# Patient Record
Sex: MALE | Race: WHITE | NOT HISPANIC OR LATINO | Employment: OTHER | URBAN - METROPOLITAN AREA
[De-identification: names, ages, dates, MRNs, and addresses within clinical notes are randomized per-mention and may not be internally consistent; named-entity substitution may affect disease eponyms.]

---

## 2019-05-22 ENCOUNTER — THERAPY VISIT (OUTPATIENT)
Dept: PHYSICAL THERAPY | Facility: CLINIC | Age: 79
End: 2019-05-22
Payer: MEDICARE

## 2019-05-22 PROCEDURE — 97110 THERAPEUTIC EXERCISES: CPT | Mod: GP | Performed by: PHYSICAL THERAPIST

## 2019-05-22 PROCEDURE — 97161 PT EVAL LOW COMPLEX 20 MIN: CPT | Mod: GP | Performed by: PHYSICAL THERAPIST

## 2019-05-22 NOTE — LETTER
[facility]  [address]  [place] [address]  [phone]    May 28, 2019    Re: Candido Holliday   :   1940  MRN:  [id number]   REFERRING PHYSICIAN:   [facility]    Date of Initial Evaluation:  19  Visits:  Rxs Used: 1  Reason for Referral:  Acute bilateral low back pain without sciatica    [facility] Initial Evaluation    Subjective:  OSWESTRY 13/100     The history is provided by the patient.  No  was used.   Candido Holliday is a 78 year old male with a lumbar condition.  Condition occurred with:  Insidious onset.  Condition occurred: for unknown reasons.  This is a new condition  2019.    Patient reports pain:  Lower lumbar spine, lumbar spine left and lumbar spine right.     and is intermittent  and reported as 4/10.  Associated symptoms:  Loss of motion. Pain is worse during the day and worse in the A.M..  Symptoms are exacerbated by bending, sitting, lifting and standing  and relieved by activity/movement.  Since onset symptoms are gradually improving.  Special tests:  X-ray.  General health as reported by patient is excellent.  Pertinent medical history includes:  None.  Medical allergies: no.  Other surgeries include:  Orthopedic surgery (Bilateral knee, right hip, right shoulder.).  Current medications:  None as reported by the patient.  Current occupation is Retired.      Barriers include:  None as reported by the patient.    Red flags:  None as reported by the patient.    Oswestry Score: 13.33 %                 Objective:  Standing Alignment:    Shoulder/UE:  Rounded shoulders  Lumbar:  Lordosis decr    Gait:    Gait Type:  Normal     Flexibility/Screens:   Positive screens:  Lumbar  Neurological: He is alert. He has normal strength and normal reflexes. No cranial nerve deficit or sensory deficit.     Lumbar/SI Evaluation  ROM:    AROM Lumbar:   Flexion:          Increased pain,  endrange pain, no worse  Ext:                    Decrease pain limited range of motion   Side Bend:        Left:  No effect    Right:  No effect  Rotation:           Left:     Right:   Side Glide:        Left:     Right:         Lumbar Myotomes:  normal    Lumbar DTR's:  normal    Lumbar Dermtomes:  normal    Neural Tension/Mobility:  Lumbar:  Normal      Lumbar Palpation:  normal    Assessment/Plan:    Patient is a 78 year old male with lumbar complaints.    Patient has the following significant findings with corresponding treatment plan.                Diagnosis 1:  Mechanical low back pain  Pain -  hot/cold therapy, self management, education, directional preference exercise and home program  Decreased ROM/flexibility - manual therapy and therapeutic exercise    Therapy Evaluation Codes:   1) History comprised of:   Personal factors that impact the plan of care:      None.    Comorbidity factors that impact the plan of care are:      None.     Medications impacting care: None.  2) Examination of Body Systems comprised of:   Body structures and functions that impact the plan of care:      Lumbar spine.   Activity limitations that impact the plan of care are:      Bending, Driving, Lifting, Sitting and Standing.  3) Clinical presentation characteristics are:   Stable/Uncomplicated.  4) Decision-Making    Low complexity using standardized patient assessment instrument and/or measureable assessment of functional outcome.    Cumulative Therapy Evaluation is: Low complexity.  Previous and current functional limitations:  (See Goal Flow Sheet for this information)    Short term and Long term goals: (See Goal Flow Sheet for this information)   Communication ability:  Patient appears to be able to clearly communicate and understand verbal and written communication and follow directions correctly.  Treatment Explanation - The following has been discussed with the patient:   RX ordered/plan of care  Anticipated  outcomes  Possible risks and side effects  This patient would benefit from PT intervention to resume normal activities.   Rehab potential is good.    Frequency:  1 X week, once daily  Duration:  for 6 weeks  Discharge Plan:  Achieve all LTG.  Independent in home treatment program.  Reach maximal therapeutic benefit.    Thank you for your referral.    INQUIRIES  Therapist: Victorino Cano, PT, ATC, Cert. [facility]  [address]  [place] [address]  Phone: [phone]  Fax: [phone]

## 2019-05-28 NOTE — PROGRESS NOTES
[facility] Initial Evaluation  Subjective:   OSWESTRY 13/100      The history is provided by the patient.  No  was used.   Candido Holliday is a 78 year old male with a lumbar condition.  Condition occurred with:  Insidious onset.  Condition occurred: for unknown reasons.  This is a new condition  March 2019.    Patient reports pain:  Lower lumbar spine, lumbar spine left and lumbar spine right.     and is intermittent  and reported as 4/10.  Associated symptoms:  Loss of motion. Pain is worse during the day and worse in the A.M..  Symptoms are exacerbated by bending, sitting, lifting and standing  and relieved by activity/movement.  Since onset symptoms are gradually improving.  Special tests:  X-ray.      General health as reported by patient is excellent.  Pertinent medical history includes:  None.  Medical allergies: no.  Other surgeries include:  Orthopedic surgery (Bilateral knee, right hip, right shoulder.).  Current medications:  None as reported by the patient.  Current occupation is Retired.        Barriers include:  None as reported by the patient.    Red flags:  None as reported by the patient.      Oswestry Score: 13.33 %                 Objective:  Standing Alignment:      Shoulder/UE:  Rounded shoulders  Lumbar:  Lordosis decr            Gait:    Gait Type:  Normal          Flexibility/Screens:   Positive screens:  Lumbar          Neurological: He is alert. He has normal strength and normal reflexes. No cranial nerve deficit or sensory deficit.            Lumbar/SI Evaluation  ROM:    AROM Lumbar:   Flexion:          Increased pain, endrange pain, no worse  Ext:                    Decrease pain limited range of motion   Side Bend:        Left:  No effect    Right:  No effect  Rotation:           Left:     Right:   Side Glide:        Left:     Right:           Lumbar Myotomes:  normal            Lumbar DTR's:  normal        Lumbar Dermtomes:   normal                Neural Tension/Mobility:  Lumbar:  Normal        Lumbar Palpation:  normal                                                         General     ROS    Assessment/Plan:    Patient is a 78 year old male with lumbar complaints.    Patient has the following significant findings with corresponding treatment plan.                Diagnosis 1:  Mechanical low back pain  Pain -  hot/cold therapy, self management, education, directional preference exercise and home program  Decreased ROM/flexibility - manual therapy and therapeutic exercise    Therapy Evaluation Codes:   1) History comprised of:   Personal factors that impact the plan of care:      None.    Comorbidity factors that impact the plan of care are:      None.     Medications impacting care: None.  2) Examination of Body Systems comprised of:   Body structures and functions that impact the plan of care:      Lumbar spine.   Activity limitations that impact the plan of care are:      Bending, Driving, Lifting, Sitting and Standing.  3) Clinical presentation characteristics are:   Stable/Uncomplicated.  4) Decision-Making    Low complexity using standardized patient assessment instrument and/or measureable assessment of functional outcome.  Cumulative Therapy Evaluation is: Low complexity.    Previous and current functional limitations:  (See Goal Flow Sheet for this information)    Short term and Long term goals: (See Goal Flow Sheet for this information)     Communication ability:  Patient appears to be able to clearly communicate and understand verbal and written communication and follow directions correctly.  Treatment Explanation - The following has been discussed with the patient:   RX ordered/plan of care  Anticipated outcomes  Possible risks and side effects  This patient would benefit from PT intervention to resume normal activities.   Rehab potential is good.    Frequency:  1 X week, once daily  Duration:  for 6 weeks  Discharge Plan:   Achieve all LTG.  Independent in home treatment program.  Reach maximal therapeutic benefit.    Addendum 7/19/19: patient has been self managing for the past couple months, Plan to discontinue PT for this episode of care.     Please refer to the daily flowsheet for treatment today, total treatment time and time spent performing 1:1 timed codes.

## 2021-01-15 ENCOUNTER — HEALTH MAINTENANCE LETTER (OUTPATIENT)
Age: 81
End: 2021-01-15

## 2021-02-12 ENCOUNTER — IMMUNIZATION (OUTPATIENT)
Dept: PEDIATRICS | Facility: CLINIC | Age: 81
End: 2021-02-12
Payer: MEDICARE

## 2021-02-12 PROCEDURE — 91300 PR COVID VAC PFIZER DIL RECON 30 MCG/0.3 ML IM: CPT

## 2021-02-12 PROCEDURE — 0001A PR COVID VAC PFIZER DIL RECON 30 MCG/0.3 ML IM: CPT

## 2021-03-05 ENCOUNTER — IMMUNIZATION (OUTPATIENT)
Dept: PEDIATRICS | Facility: CLINIC | Age: 81
End: 2021-03-05
Attending: INTERNAL MEDICINE
Payer: MEDICARE

## 2021-03-05 PROCEDURE — 91300 PR COVID VAC PFIZER DIL RECON 30 MCG/0.3 ML IM: CPT

## 2021-03-05 PROCEDURE — 0002A PR COVID VAC PFIZER DIL RECON 30 MCG/0.3 ML IM: CPT

## 2021-09-04 ENCOUNTER — HEALTH MAINTENANCE LETTER (OUTPATIENT)
Age: 81
End: 2021-09-04

## 2022-02-19 ENCOUNTER — HEALTH MAINTENANCE LETTER (OUTPATIENT)
Age: 82
End: 2022-02-19

## 2022-09-30 ENCOUNTER — HOSPITAL ENCOUNTER (INPATIENT)
Facility: CLINIC | Age: 82
LOS: 5 days | Discharge: HOME | DRG: 246 | End: 2022-10-05
Attending: EMERGENCY MEDICINE | Admitting: INTERNAL MEDICINE
Payer: MEDICARE

## 2022-09-30 ENCOUNTER — APPOINTMENT (OUTPATIENT)
Dept: CT IMAGING | Facility: CLINIC | Age: 82
DRG: 246 | End: 2022-09-30
Attending: EMERGENCY MEDICINE
Payer: MEDICARE

## 2022-09-30 DIAGNOSIS — I25.110 CORONARY ARTERY DISEASE INVOLVING NATIVE CORONARY ARTERY OF NATIVE HEART WITH UNSTABLE ANGINA PECTORIS (H): ICD-10-CM

## 2022-09-30 DIAGNOSIS — I21.4 NSTEMI (NON-ST ELEVATED MYOCARDIAL INFARCTION) (H): Primary | ICD-10-CM

## 2022-09-30 PROBLEM — R53.1 WEAKNESS: Status: ACTIVE | Noted: 2022-09-30

## 2022-09-30 PROBLEM — R55 SYNCOPE: Status: ACTIVE | Noted: 2022-09-30

## 2022-09-30 PROBLEM — F41.9 ANXIETY: Status: ACTIVE | Noted: 2022-09-30

## 2022-09-30 PROBLEM — R06.02 SHORTNESS OF BREATH: Status: ACTIVE | Noted: 2022-09-30

## 2022-09-30 PROBLEM — F32.A DEPRESSION: Status: ACTIVE | Noted: 2022-09-30

## 2022-09-30 LAB
ALBUMIN SERPL-MCNC: 3 G/DL (ref 3.4–5)
ALP SERPL-CCNC: 53 U/L (ref 40–150)
ALT SERPL W P-5'-P-CCNC: 79 U/L (ref 0–70)
ANION GAP SERPL CALCULATED.3IONS-SCNC: 5 MMOL/L (ref 3–14)
AST SERPL W P-5'-P-CCNC: 67 U/L (ref 0–45)
ATRIAL RATE - MUSE: 64 BPM
BASOPHILS # BLD AUTO: 0 10E3/UL (ref 0–0.2)
BASOPHILS NFR BLD AUTO: 1 %
BILIRUB SERPL-MCNC: 0.9 MG/DL (ref 0.2–1.3)
BUN SERPL-MCNC: 19 MG/DL (ref 7–30)
CALCIUM SERPL-MCNC: 8.5 MG/DL (ref 8.5–10.1)
CHLORIDE BLD-SCNC: 102 MMOL/L (ref 94–109)
CO2 SERPL-SCNC: 29 MMOL/L (ref 20–32)
CREAT SERPL-MCNC: 1.07 MG/DL (ref 0.66–1.25)
D DIMER PPP FEU-MCNC: 1.33 UG/ML FEU (ref 0–0.5)
DIASTOLIC BLOOD PRESSURE - MUSE: NORMAL MMHG
EOSINOPHIL # BLD AUTO: 0 10E3/UL (ref 0–0.7)
EOSINOPHIL NFR BLD AUTO: 1 %
ERYTHROCYTE [DISTWIDTH] IN BLOOD BY AUTOMATED COUNT: 13.1 % (ref 10–15)
ERYTHROCYTE [DISTWIDTH] IN BLOOD BY AUTOMATED COUNT: 13.2 % (ref 10–15)
GFR SERPL CREATININE-BSD FRML MDRD: 69 ML/MIN/1.73M2
GLUCOSE BLD-MCNC: 97 MG/DL (ref 70–99)
HCT VFR BLD AUTO: 42.4 % (ref 40–53)
HCT VFR BLD AUTO: 45.1 % (ref 40–53)
HGB BLD-MCNC: 14.3 G/DL (ref 13.3–17.7)
HGB BLD-MCNC: 14.9 G/DL (ref 13.3–17.7)
HOLD SPECIMEN: NORMAL
IMM GRANULOCYTES # BLD: 0 10E3/UL
IMM GRANULOCYTES NFR BLD: 0 %
INR PPP: 1.01 (ref 0.85–1.15)
INTERPRETATION ECG - MUSE: NORMAL
LIPASE SERPL-CCNC: 133 U/L (ref 73–393)
LYMPHOCYTES # BLD AUTO: 1.3 10E3/UL (ref 0.8–5.3)
LYMPHOCYTES NFR BLD AUTO: 32 %
MCH RBC QN AUTO: 30.6 PG (ref 26.5–33)
MCH RBC QN AUTO: 31 PG (ref 26.5–33)
MCHC RBC AUTO-ENTMCNC: 33 G/DL (ref 31.5–36.5)
MCHC RBC AUTO-ENTMCNC: 33.7 G/DL (ref 31.5–36.5)
MCV RBC AUTO: 91 FL (ref 78–100)
MCV RBC AUTO: 94 FL (ref 78–100)
MONOCYTES # BLD AUTO: 0.6 10E3/UL (ref 0–1.3)
MONOCYTES NFR BLD AUTO: 14 %
NEUTROPHILS # BLD AUTO: 2.2 10E3/UL (ref 1.6–8.3)
NEUTROPHILS NFR BLD AUTO: 52 %
NRBC # BLD AUTO: 0 10E3/UL
NRBC BLD AUTO-RTO: 0 /100
NT-PROBNP SERPL-MCNC: 1028 PG/ML (ref 0–1800)
P AXIS - MUSE: 2 DEGREES
PLATELET # BLD AUTO: 144 10E3/UL (ref 150–450)
PLATELET # BLD AUTO: 145 10E3/UL (ref 150–450)
POTASSIUM BLD-SCNC: 4.3 MMOL/L (ref 3.4–5.3)
PR INTERVAL - MUSE: 326 MS
PROT SERPL-MCNC: 7.1 G/DL (ref 6.8–8.8)
QRS DURATION - MUSE: 132 MS
QT - MUSE: 410 MS
QTC - MUSE: 422 MS
R AXIS - MUSE: -8 DEGREES
RBC # BLD AUTO: 4.67 10E6/UL (ref 4.4–5.9)
RBC # BLD AUTO: 4.81 10E6/UL (ref 4.4–5.9)
SARS-COV-2 RNA RESP QL NAA+PROBE: POSITIVE
SODIUM SERPL-SCNC: 136 MMOL/L (ref 133–144)
SYSTOLIC BLOOD PRESSURE - MUSE: NORMAL MMHG
T AXIS - MUSE: 58 DEGREES
TROPONIN I SERPL HS-MCNC: 4627 NG/L
TROPONIN I SERPL HS-MCNC: 5430 NG/L
VENTRICULAR RATE- MUSE: 64 BPM
WBC # BLD AUTO: 4.2 10E3/UL (ref 4–11)
WBC # BLD AUTO: 5.1 10E3/UL (ref 4–11)

## 2022-09-30 PROCEDURE — 96376 TX/PRO/DX INJ SAME DRUG ADON: CPT

## 2022-09-30 PROCEDURE — 83690 ASSAY OF LIPASE: CPT | Performed by: EMERGENCY MEDICINE

## 2022-09-30 PROCEDURE — 250N000013 HC RX MED GY IP 250 OP 250 PS 637: Performed by: EMERGENCY MEDICINE

## 2022-09-30 PROCEDURE — 99285 EMERGENCY DEPT VISIT HI MDM: CPT | Mod: 25

## 2022-09-30 PROCEDURE — U0003 INFECTIOUS AGENT DETECTION BY NUCLEIC ACID (DNA OR RNA); SEVERE ACUTE RESPIRATORY SYNDROME CORONAVIRUS 2 (SARS-COV-2) (CORONAVIRUS DISEASE [COVID-19]), AMPLIFIED PROBE TECHNIQUE, MAKING USE OF HIGH THROUGHPUT TECHNOLOGIES AS DESCRIBED BY CMS-2020-01-R: HCPCS | Performed by: EMERGENCY MEDICINE

## 2022-09-30 PROCEDURE — 85004 AUTOMATED DIFF WBC COUNT: CPT | Performed by: EMERGENCY MEDICINE

## 2022-09-30 PROCEDURE — 210N000002 HC R&B HEART CARE

## 2022-09-30 PROCEDURE — 84484 ASSAY OF TROPONIN QUANT: CPT | Performed by: NURSE PRACTITIONER

## 2022-09-30 PROCEDURE — 85025 COMPLETE CBC W/AUTO DIFF WBC: CPT | Performed by: EMERGENCY MEDICINE

## 2022-09-30 PROCEDURE — 85610 PROTHROMBIN TIME: CPT | Performed by: EMERGENCY MEDICINE

## 2022-09-30 PROCEDURE — 36415 COLL VENOUS BLD VENIPUNCTURE: CPT | Performed by: NURSE PRACTITIONER

## 2022-09-30 PROCEDURE — 84484 ASSAY OF TROPONIN QUANT: CPT | Performed by: EMERGENCY MEDICINE

## 2022-09-30 PROCEDURE — 85379 FIBRIN DEGRADATION QUANT: CPT | Performed by: EMERGENCY MEDICINE

## 2022-09-30 PROCEDURE — 250N000009 HC RX 250: Performed by: EMERGENCY MEDICINE

## 2022-09-30 PROCEDURE — 71275 CT ANGIOGRAPHY CHEST: CPT | Mod: MA

## 2022-09-30 PROCEDURE — C9803 HOPD COVID-19 SPEC COLLECT: HCPCS

## 2022-09-30 PROCEDURE — 250N000011 HC RX IP 250 OP 636: Performed by: EMERGENCY MEDICINE

## 2022-09-30 PROCEDURE — 99222 1ST HOSP IP/OBS MODERATE 55: CPT | Mod: AI | Performed by: INTERNAL MEDICINE

## 2022-09-30 PROCEDURE — 99207 PR APP CREDIT; MD BILLING SHARED VISIT: CPT | Performed by: NURSE PRACTITIONER

## 2022-09-30 PROCEDURE — 83880 ASSAY OF NATRIURETIC PEPTIDE: CPT | Performed by: NURSE PRACTITIONER

## 2022-09-30 PROCEDURE — 36415 COLL VENOUS BLD VENIPUNCTURE: CPT | Performed by: EMERGENCY MEDICINE

## 2022-09-30 PROCEDURE — 93005 ELECTROCARDIOGRAM TRACING: CPT

## 2022-09-30 PROCEDURE — 80053 COMPREHEN METABOLIC PANEL: CPT | Performed by: EMERGENCY MEDICINE

## 2022-09-30 PROCEDURE — 250N000013 HC RX MED GY IP 250 OP 250 PS 637: Performed by: NURSE PRACTITIONER

## 2022-09-30 PROCEDURE — 85027 COMPLETE CBC AUTOMATED: CPT | Performed by: EMERGENCY MEDICINE

## 2022-09-30 PROCEDURE — 96365 THER/PROPH/DIAG IV INF INIT: CPT | Mod: 59

## 2022-09-30 PROCEDURE — 96366 THER/PROPH/DIAG IV INF ADDON: CPT

## 2022-09-30 RX ORDER — MELOXICAM 15 MG/1
15 TABLET ORAL DAILY
COMMUNITY
End: 2023-03-30

## 2022-09-30 RX ORDER — HEPARIN SODIUM 10000 [USP'U]/100ML
0-5000 INJECTION, SOLUTION INTRAVENOUS CONTINUOUS
Status: DISCONTINUED | OUTPATIENT
Start: 2022-09-30 | End: 2022-10-01

## 2022-09-30 RX ORDER — IOPAMIDOL 755 MG/ML
75 INJECTION, SOLUTION INTRAVASCULAR ONCE
Status: COMPLETED | OUTPATIENT
Start: 2022-09-30 | End: 2022-09-30

## 2022-09-30 RX ORDER — VENLAFAXINE HYDROCHLORIDE 150 MG/1
150 CAPSULE, EXTENDED RELEASE ORAL DAILY
COMMUNITY

## 2022-09-30 RX ORDER — VENLAFAXINE HYDROCHLORIDE 150 MG/1
150 CAPSULE, EXTENDED RELEASE ORAL DAILY
Status: DISCONTINUED | OUTPATIENT
Start: 2022-10-01 | End: 2022-10-05 | Stop reason: HOSPADM

## 2022-09-30 RX ORDER — OMEPRAZOLE 40 MG/1
40 CAPSULE, DELAYED RELEASE ORAL DAILY
COMMUNITY

## 2022-09-30 RX ORDER — ASPIRIN 81 MG/1
162 TABLET, CHEWABLE ORAL ONCE
Status: COMPLETED | OUTPATIENT
Start: 2022-09-30 | End: 2022-09-30

## 2022-09-30 RX ORDER — PANTOPRAZOLE SODIUM 40 MG/1
40 TABLET, DELAYED RELEASE ORAL
Status: DISCONTINUED | OUTPATIENT
Start: 2022-10-01 | End: 2022-10-05 | Stop reason: HOSPADM

## 2022-09-30 RX ORDER — ALPRAZOLAM 0.25 MG
0.25 TABLET ORAL 2 TIMES DAILY PRN
Status: DISCONTINUED | OUTPATIENT
Start: 2022-09-30 | End: 2022-10-05 | Stop reason: HOSPADM

## 2022-09-30 RX ORDER — ATENOLOL 25 MG/1
25 TABLET ORAL DAILY
Status: ON HOLD | COMMUNITY
End: 2022-10-04

## 2022-09-30 RX ORDER — TAMSULOSIN HYDROCHLORIDE 0.4 MG/1
0.4 CAPSULE ORAL DAILY
COMMUNITY

## 2022-09-30 RX ADMIN — METOPROLOL TARTRATE 12.5 MG: 25 TABLET, FILM COATED ORAL at 21:25

## 2022-09-30 RX ADMIN — HEPARIN SODIUM 1150 UNITS/HR: 10000 INJECTION, SOLUTION INTRAVENOUS at 18:02

## 2022-09-30 RX ADMIN — SODIUM CHLORIDE 98 ML: 9 INJECTION, SOLUTION INTRAVENOUS at 16:54

## 2022-09-30 RX ADMIN — ASPIRIN 81 MG CHEWABLE TABLET 162 MG: 81 TABLET CHEWABLE at 18:04

## 2022-09-30 RX ADMIN — IOPAMIDOL 75 ML: 755 INJECTION, SOLUTION INTRAVENOUS at 16:54

## 2022-09-30 RX ADMIN — ALUMINUM HYDROXIDE, MAGNESIUM HYDROXIDE, AND DIMETHICONE 30 ML: 200; 20; 200 SUSPENSION ORAL at 13:40

## 2022-09-30 ASSESSMENT — ENCOUNTER SYMPTOMS
BLOOD IN STOOL: 0
HEMATURIA: 0
CONSTIPATION: 0
DIFFICULTY URINATING: 0
SHORTNESS OF BREATH: 1
DYSURIA: 0
DIARRHEA: 0

## 2022-09-30 ASSESSMENT — ACTIVITIES OF DAILY LIVING (ADL)
ADLS_ACUITY_SCORE: 35

## 2022-09-30 NOTE — Clinical Note
The first balloon was inserted into the circumflex.Max pressure = 15 dannie. Total duration = 30 seconds.

## 2022-09-30 NOTE — ED TRIAGE NOTES
Chest pressure last 2 days on and off. Shortness of breath for 2 weeks. No appetite.     Triage Assessment     Row Name 09/30/22 1370       Triage Assessment (Adult)    Airway WDL WDL       Respiratory WDL    Respiratory WDL WDL       Skin Circulation/Temperature WDL    Skin Circulation/Temperature WDL WDL       Cardiac WDL    Cardiac WDL X  mid chest pain       Peripheral/Neurovascular WDL    Peripheral Neurovascular WDL WDL       Cognitive/Neuro/Behavioral WDL    Cognitive/Neuro/Behavioral WDL WDL

## 2022-09-30 NOTE — Clinical Note
The first balloon was inserted into the circumflex.Max pressure = 14 dannie. Total duration = 10 seconds.     Max pressure = 20 dannie. Total duration = 14 seconds.    Balloon reinflated a second time: Max pressure = 20 dannie. Total duration = 14 seconds.

## 2022-09-30 NOTE — Clinical Note
The first balloon was inserted into the circumflex.Max pressure = 8 dannie. Total duration = 9 seconds.     Max pressure = 8 dannie. Total duration = 8 seconds.    Balloon reinflated a second time: Max pressure = 8 dannie. Total duration = 8 seconds.  Balloon reinflated a third time: Max pressure = 8 dannie. Total duration = 11 seconds.  Balloon reinflated a fourth time: Max pressure = 10 dannie. Total duration = 9 seconds.

## 2022-09-30 NOTE — ED PROVIDER NOTES
And  History   Chief Complaint:  Chest Pain     The history is provided by the patient and the spouse.      Candido Holliday is a 81 year old male with history of sleep apnea who presents with chest pain. Patient reports that for the past few weeks he wakes up with mid sternal/lower chest pain and shortness of breath that gradually improves throughout the day. He states that each episode lasts for a few minutes and resolves on its own. Nothing notably exacerbates his pain and shortness of breath. He notes that he easily acquires pneumonia. No blood thinners, leg swelling, recent long travel or recent surgeries. He adds that he gardens often, which will occasionally help his symptoms. No history of abdominal surgeries. No change in bladder or bowel movements. No black/bloody stools or difficulty urinating.    Review of Systems   Respiratory: Positive for shortness of breath.    Cardiovascular: Positive for chest pain. Negative for leg swelling.   Gastrointestinal: Negative for blood in stool, constipation and diarrhea.   Genitourinary: Negative for difficulty urinating, dysuria and hematuria.   All other systems reviewed and are negative.    Allergies:  Legumes  Peas  No Known Drug Allergies     Medications:  Aspirin 81 mg   Alprazolam  Tamsulosin  Meloxicam  Atenolol  Venlafaxine  Omeprazole    Past Medical History:     Depression  Strep throat  Influenza  Left shoulder osteoarthritis  Cervical dystonia  Rotator cuff tear  Traumatic rupture of proximal biceps tendon, right  Impingement syndrome of right shoulder  Right shoulder subscapularis tear  Sleep apnea  Epididymitis  Reflux    Past Surgical History:    Bilateral knee replacement  Right hip replacement  Right shoulder arthroscopic-assisted RCR, SAD, AC joint resection, coracoid decompression, and open subpectoralis biceps resection/transplantation  Appendectomy    Family History:    Father - heart murmur, pneumonia  Mother - stroke    Social History:  Presents  "with his wife    Presents via private vehicle  PCP: Enrique Truong     Physical Exam     Patient Vitals for the past 24 hrs:   BP Temp Temp src Pulse Resp SpO2 Height Weight   09/30/22 1329 129/70 97.3  F (36.3  C) Temporal 66 20 97 % 1.905 m (6' 3\") 95.3 kg (210 lb)       Physical Exam  Vitals reviewed.   HENT:      Head: Normocephalic.   Cardiovascular:      Rate and Rhythm: Normal rate and regular rhythm.      Heart sounds: Normal heart sounds.   Pulmonary:      Effort: Pulmonary effort is normal.      Breath sounds: Normal breath sounds.   Musculoskeletal:         General: Normal range of motion.   Skin:     General: Skin is warm.      Capillary Refill: Capillary refill takes less than 2 seconds.   Neurological:      General: No focal deficit present.      Mental Status: He is alert and oriented to person, place, and time.   Psychiatric:         Mood and Affect: Mood is anxious.           Emergency Department Course   ECG  ECG results from 09/30/22 @ 1325   EKG 12 lead     Value    Systolic Blood Pressure     Diastolic Blood Pressure     Ventricular Rate 64    Atrial Rate 64    AZ Interval 326    QRS Duration 132        QTc 422    P Axis 2    R AXIS -8    T Axis 58    Interpretation ECG      Sinus rhythm with 1st degree A-V block  Right bundle branch block  When compared with ECG of 17-NOV-2014 09:16,  No change was found.         Imaging:  CT Chest Pulmonary Embolism w Contrast   Final Result   IMPRESSION:   1.  No pulmonary embolism demonstrated.   2.  Trace peripheral probable atelectasis versus less likely infiltrate.   3.  Elevated right hemidiaphragm.           Report per radiology    Laboratory:  Labs Ordered and Resulted from Time of ED Arrival to Time of ED Departure   COMPREHENSIVE METABOLIC PANEL - Abnormal       Result Value    Sodium 136      Potassium 4.3      Chloride 102      Carbon Dioxide (CO2) 29      Anion Gap 5      Urea Nitrogen 19      Creatinine 1.07      Calcium 8.5      " Glucose 97      Alkaline Phosphatase 53      AST 67 (*)     ALT 79 (*)     Protein Total 7.1      Albumin 3.0 (*)     Bilirubin Total 0.9      GFR Estimate 69     D DIMER QUANTITATIVE - Abnormal    D-Dimer Quantitative 1.33 (*)    CBC WITH PLATELETS AND DIFFERENTIAL - Abnormal    WBC Count 4.2      RBC Count 4.81      Hemoglobin 14.9      Hematocrit 45.1      MCV 94      MCH 31.0      MCHC 33.0      RDW 13.2      Platelet Count 144 (*)     % Neutrophils 52      % Lymphocytes 32      % Monocytes 14      % Eosinophils 1      % Basophils 1      % Immature Granulocytes 0      NRBCs per 100 WBC 0      Absolute Neutrophils 2.2      Absolute Lymphocytes 1.3      Absolute Monocytes 0.6      Absolute Eosinophils 0.0      Absolute Basophils 0.0      Absolute Immature Granulocytes 0.0      Absolute NRBCs 0.0     TROPONIN I - Abnormal    Troponin I High Sensitivity 4,627 (*)    INR - Normal    INR 1.01     LIPASE - Normal    Lipase 133     CBC WITH PLATELETS   COVID-19 VIRUS (CORONAVIRUS) BY PCR      Emergency Department Course:     Reviewed:  I reviewed nursing notes, vitals, past medical history and Care Everywhere    Assessments:  1703 I obtained history and examined the patient as noted above.   1737 I rechecked the patient and explained findings.     Consults:  1754 I spoke with Dr. Valdes, hospitalist, who accepts admission.    Interventions:  1340 GI cocktail, 30 mL, PO  1801 Heparin loading dose, 5,700 unit(s), IV  1802 Heparin infusion, 1,150 unit(s)/hr, IV  1804 Aspirin, 162 mg, PO    Disposition:  The patient was admitted to the hospital under the care of Dr. Valdes.     Impression & Plan   Medical Decision Making:  Patient is a 81-year-old male who presents with intermittent chest pain for the last few weeks and episode of near syncope a week ago.  Patient arrival is chest pain-free.  EKG is normal however troponin came back quite elevated over 4000.  This was delayed due to lab error on missed measurement of the  lab test.  Ultimately care was discussed with the patient cannot rule out non-ST segment ovation MI recommend admission for serial troponins and further work-up as indicated.  Patient's history is somewhat atypical due to exertional tolerance able to tolerate gardening and activities at home without difficulty and no chest pain or shortness of breath with exertion.  However due to the positive troponin patient is at risk for 30-day mortality recommend admission for further work-up.  Care was discussed with the hospitalist and was admitted as an inpatient.    Diagnosis:    ICD-10-CM    1. NSTEMI (non-ST elevated myocardial infarction) (H)  I21.4        Scribe Disclosure:  Tray PATEL, am serving as a scribe at 5:01 PM on 9/30/2022 to document services personally performed by Adonis Lemus MD based on my observations and the provider's statements to me.          Adonis Lemus MD  10/01/22 6113

## 2022-09-30 NOTE — Clinical Note
Pt states it just want to go home.       Deb Young RN  12/02/20 0762 R-Band utilized for closure of site.  Mechanical pressure applied applied by scrub person; hemostasis achieved.

## 2022-09-30 NOTE — ED NOTES
Rapid Assessment Note    History:   Candido Holliday is a 81 year old male who presents with his wife with intermittent, nonradiating localized central lower chest/upper abdominal pain with associated shortness of breath and decreased appetite over the last few days. He states the pressure is most apparent in the morning when he wakes up and it takes him longer to adjust in the morning. The pressure is relieved during the day, however, his other symptoms persist. He denies history of heart problems. He denies emesis.    Exam:   General:  Alert, interactive  Cardiovascular:  Well perfused, 2+ radial pulses bilat, RRR  Pulm/chest:  No respiratory distress, no accessory muscle use, chest NT, clear and equal BS bilat  Abd: ND, soft, NT  Neuro:  Moving all 4 extremities  Skin:  Warm, dry  Psych: anxious    Plan of Care:   I evaluated the patient and developed an initial plan of care. I discussed this plan and explained that I, or one of my partners, would be returning to complete the evaluation.     I, Molly Kirkland, am serving as a scribe to document services personally performed by Erik Cleary MD, based on my observations and the provider's statements to me.    9/30/2022  [employer]    Portions of this medical record were completed by a scribe. UPON MY REVIEW AND AUTHENTICATION BY ELECTRONIC SIGNATURE, this confirms (a) I performed the applicable clinical services, and (b) the record is accurate.        Erik Cleary MD  09/30/22 3425

## 2022-09-30 NOTE — Clinical Note
Max pressure = 12 dannie. Total duration = 15 seconds.     Max pressure = 12 dannie. Total duration = 12 seconds.    Balloon reinflated a second time: Max pressure = 12 dannie. Total duration = 12 seconds.  Balloon reinflated a third time: Max pressure = 16 dannie. Total duration = 10 seconds.  Balloon reinflated a fourth time: Max pressure = 18 dannie. Total duration = 10 seconds.

## 2022-09-30 NOTE — Clinical Note
Max pressure = 10 dannie. Total duration = 10 seconds.     Max pressure = 12 dannie. Total duration = 9 seconds.    Balloon reinflated a second time: Max pressure = 12 dannie. Total duration = 9 seconds.

## 2022-09-30 NOTE — H&P
"[facility]    History and Physical  Hospitalist       Date of Admission:  9/30/2022    Assessment & Plan   Candido Holliday is a 81 year old male with PMH significant for GERD, depression, anxiety and SARITA who presented to the ED with complaints of epigastric, lower sternal chest pain, weakness and dizziness that has progressed over the past 4-5 days with recent syncopal event at that time as well. No personal or past family history of HTN, HLD or heart disease. Never a smoker and rare alcohol use. He endorses having a history of \"fluttering\" or \"skipped beats\" and states that he had this in his 20's but denies medication currently or recurrence of this. Work-up in the ED revealed unremarkable BMP and CBC. Mild elevation of ALT/AST without abdominal complaints. D-dimer elevated at 1.33 with CT Chest negative for PE and also revealed elevated right hemidiaphragm and trace peripheral atelectasis. ECG with SR 1AVB and RBBB without obvious signs of ischemia.Troponin elevated at 4627. He was initiated on ASA and heparin infusion in the ED. He was admitted for Cardiology consult and further work-up.     NSTEMI  Chest Pain  Weakness  Shortness of breath  Syncope  Admit with vague, but progressive symptoms that have been ongoing for the past several weeks and have worsened over the last 4-5 days, including SOB, dizziness, weakness, loss of appetite, and patient reported syncopal episode 4 days ago accompanied by dizziness and diaphoresis. Reports that he has a history of chest \"fluttering\" that occurred in his 20's; has not had recurrence and states that he is not on PTA meds for, despite past medication records showing prior use of atenolol.   -Admit to [facility] for NSTEMI work-up with troponin elevation of 4627  -Cardiology consult for likely angiogram; appreciate recommendations  -ASA  -Mild thrombocytopenia at 144 without signs of bleeding and on ASA regimen PTA   -Monitor counts  -Resume heparin " "infusion per protocol  -Lipid panel and Hgb A1c pending  -Start Metoprolol with hold parameters  -Electrolyte protocol  -Trend troponin  -Cardiac diet and NPO at midnight in the event of angiogram in AM  -Bedrest with bathroom privileges       Elevated LFT's  -Unclear etiology with no complaints of abdominal lupe  -Slight elevation of AST/ALT  -Consider US  -Repeat LFT's in AM      Depression  Anxiety  During exam, generalized shaking and endorsing significant anxiety  -PTA regimen: Effexor 150 mg daily  -Alprazolam: takes alprazolam that he states that he has had for the past 15 + years and rarely uses; has taken one tablet daily for the past 4 days due to symptoms noted above that he believed were related to anxiety  -Resume Effexor  -PRN alprazolam while IP  -Has follow-up with PCP for his depression and adjustment of medications on October 5th      SARITA  -CPAP at night    Clinically Significant Risk Factors Present on Admission             # Hypoalbuminemia: Albumin = 3.0 g/dL (Ref range: 3.4 - 5.0 g/dL) on admission, will monitor as appropriate        # Overweight: Estimated body mass index is 26.25 kg/m  as calculated from the following:    Height as of this encounter: 1.905 m (6' 3\").    Weight as of this encounter: 95.3 kg (210 lb).          DVT Prophylaxis: Heparin  Code Status: Full Code      Disposition: Anticipate at least 2 midnight stay for likely angiogram and further work-up of chest pain.     Alyssa Cheng NP  [facility]  Securely message with the Vocera Web Console (learn more here)  Text page via [facility] Paging/Directory       Primary Care Physician   Enrique Truong    Chief Complaint   Chest Pain    History is obtained from the patient and review of the EMR    History of Present Illness   Candido Holliday is a 81 year old male with PMH significant for GERD, depression, anxiety and SARITA who presented to the ED with complaints of epigastric, lower sternal chest pain, weakness " "and dizziness that has progressed over the past 4-5 days. Reports that he has had significant stress over the past few days and has had a prescription of alprazolam for the past 15 plus years. He took one tablet, daily in the past 3-4 days. States that he went into the bathroom when he had a syncopal episode. Reports that his son is in healthcare and had looked up the side effects of alprazolam on [facility]'s website and thought that his current use of this medication had contributed to his overall symptoms. He endorses loss of appetite. He was diaphoretic and dizzy prior to his syncopal episode and has not had a recurrence. Reports that he has had progressive shortness of breath in the past 4-5 days as well. No history of HTN, HLD or heart disease and no known family history. He takes ASA daily. Endorses that he has severe depression and has been following with his PCP closely on this. He is on Effexor and has a follow-up appointment with his PCP for dose adjustment on Wednesday.    Denies current chest pain/pressure. Has some shortness of breath. Denies dizziness and lightheadedness. Denies n/v/d. Reports some \"epigastic pain\" not relieved with GI cocktail. Work-up in the ED revealed unremarkable BMP and CBC. Mild elevation of ALT/AST without abdominal complaints. D-dimer elevated at 1.33 with CT Chest negative for PE. Troponin elevated at 4627. He was initiated on ASA and heparin infusion in the ED. He was admitted for Cardiology consult and further work-up.     PAST MEDICAL HISTORY  Past Medical History:   Diagnosis Date     Depressive disorder      Gastro-oesophageal reflux disease        PAST SURGICAL HISTORY  Past Surgical History:   Procedure Laterality Date     ORTHOPEDIC SURGERY         HOME MEDICATIONS  Prior to Admission medications    Medication Sig Last Dose Taking? Auth Provider Long Term End Date   ASPIRIN 81 MG OR TABS 1 TABLET DAILY   Reported, Patient     EFFEXOR 75 MG OR TABS 1 TABLET TWICE " "DAILY WITH FOOD   Reported, Patient     PREVACID 30 MG OR CPDR 1 CAPSULE DAILY BEFORE EATING   Reported, Patient     XANAX OR None Entered   Reported, Patient         ALLERGIES  No Known Allergies    SOCIAL HISTORY   reports that he has never smoked. He does not have any smokeless tobacco history on file. He reports current alcohol use. He reports that he does not use drugs.    FAMILY HISTORY  family history is not on file.    REVIEW OF SYSTEMS  A 10 point ROS was negative other than the symptoms noted above in the HPI.    Physical Exam   Nursing Notes Reviewed.  /70   Pulse 66   Temp 97.3  F (36.3  C) (Temporal)   Resp 20   Ht 1.905 m (6' 3\")   Wt 95.3 kg (210 lb)   SpO2 97%   BMI 26.25 kg/m     General:  Appears stated age in no acute distress. Anxious  Skin:  Warm, dry. No rashes or lesions on exposed skin.  HEENT:  Normocephalic, atraumatic; EOMs grossly intact.  Neck:  Supple.  Chest:  Breath sounds CTA and no increased work of breathing.  Cardiovascular:  RRR, no rub or murmur. No peripheral edema.  Abdomen:  Soft, non-tender, non-distended.  Musculoskeletal:  Moves all four extremities.  Neurological:  CN 2-12 grossly intact. Notably anxious and generalized shaking    Data   Data reviewed today:  I personally reviewed   Recent Labs   Lab 09/30/22  1335   WBC 4.2   HGB 14.9   MCV 94   *   INR 1.01      POTASSIUM 4.3   CHLORIDE 102   CO2 29   BUN 19   CR 1.07   ANIONGAP 5   BHARTI 8.5   GLC 97   ALBUMIN 3.0*   PROTTOTAL 7.1   BILITOTAL 0.9   ALKPHOS 53   ALT 79*   AST 67*   LIPASE 133       Imaging:  Recent Results (from the past 24 hour(s))   CT Chest Pulmonary Embolism w Contrast    Narrative    EXAM: CT CHEST PULMONARY EMBOLISM W CONTRAST  LOCATION: [facility]  DATE/TIME: 9/30/2022 5:12 PM    INDICATION: Chest pain, elevated D-dimer.    COMPARISON: None.    TECHNIQUE: CT chest pulmonary angiogram during arterial phase injection of IV contrast. Multiplanar " reformats and MIP reconstructions were performed. Dose reduction techniques were used.     CONTRAST: 75 mL Isovue 370.    FINDINGS:  ANGIOGRAM CHEST: Pulmonary arteries are normal caliber and negative for pulmonary emboli. Thoracic aorta is negative for dissection. No CT evidence of right heart strain.    LUNGS AND PLEURA: Elevated right hemidiaphragm. Trace dependent probable atelectasis versus less likely infiltrate. No effusions.    MEDIASTINUM/AXILLAE: No adenopathy or aneurysm.    CORONARY ARTERY CALCIFICATION: Moderate.    UPPER ABDOMEN: No acute findings.    MUSCULOSKELETAL: No frankly destructive bony lesions.      Impression    IMPRESSION:  1.  No pulmonary embolism demonstrated.  2.  Trace peripheral probable atelectasis versus less likely infiltrate.  3.  Elevated right hemidiaphragm.

## 2022-09-30 NOTE — ED NOTES
"[facility]  ED Nurse Handoff Report    ED Chief complaint: Chest Pain      ED Diagnosis:   Final diagnoses:   None       Code Status: not addressed at this time    Allergies: No Known Allergies    Patient Story: Candido Holliday is a 81 year old male who presents with his wife with intermittent, nonradiating localized central lower chest/upper abdominal pain with associated shortness of breath and decreased appetite over the last few days. He states the pressure is most apparent in the morning when he wakes up and it takes him longer to adjust in the morning. The pressure is relieved during the day, however, his other symptoms persist. He denies history of heart problems. He denies emesis.    Focused Assessment:  Patient denies pain. States the pain is worse in the morning and states he was nausea this morning. States this has been going on for a few days.     Treatments and/or interventions provided: labs, IV, heparin, EKG, CT PE scan, ASA  Patient's response to treatments and/or interventions: No change    To be done/followed up on inpatient unit:  [facility]    Does this patient have any cognitive concerns?: A/Ox4    Activity level - Baseline/Home:  Independent  Activity Level - Current:   Stand with Assist    Patient's Preferred language: English   Needed?: No    Isolation: None  Infection: Not Applicable  Patient tested for COVID 19 prior to admission: NO  Bariatric?: No    Vital Signs:   Vitals:    09/30/22 1329   BP: 129/70   Pulse: 66   Resp: 20   Temp: 97.3  F (36.3  C)   TempSrc: Temporal   SpO2: 97%   Weight: 95.3 kg (210 lb)   Height: 1.905 m (6' 3\")       Cardiac Rhythm:     Was the PSS-3 completed:   Yes  What interventions are required if any?               Family Comments: Wife at bedside   OBS brochure/video discussed/provided to patient/family: N/A              Name of person given brochure if not patient: NA              Relationship to patient: NA    For the majority of the " shift this patient's behavior was Green.   Behavioral interventions performed were None.    ED NURSE PHONE NUMBER: [phone]

## 2022-10-01 ENCOUNTER — APPOINTMENT (OUTPATIENT)
Dept: CARDIOLOGY | Facility: CLINIC | Age: 82
DRG: 246 | End: 2022-10-01
Attending: NURSE PRACTITIONER
Payer: MEDICARE

## 2022-10-01 LAB
ALBUMIN SERPL-MCNC: 3.1 G/DL (ref 3.4–5)
ALP SERPL-CCNC: 46 U/L (ref 40–150)
ALT SERPL W P-5'-P-CCNC: 69 U/L (ref 0–70)
ANION GAP SERPL CALCULATED.3IONS-SCNC: 5 MMOL/L (ref 3–14)
AST SERPL W P-5'-P-CCNC: 51 U/L (ref 0–45)
BASOPHILS # BLD AUTO: 0 10E3/UL (ref 0–0.2)
BASOPHILS NFR BLD AUTO: 0 %
BILIRUB DIRECT SERPL-MCNC: 0.3 MG/DL (ref 0–0.2)
BILIRUB SERPL-MCNC: 1.3 MG/DL (ref 0.2–1.3)
BUN SERPL-MCNC: 17 MG/DL (ref 7–30)
CALCIUM SERPL-MCNC: 8.6 MG/DL (ref 8.5–10.1)
CHLORIDE BLD-SCNC: 105 MMOL/L (ref 94–109)
CHOLEST SERPL-MCNC: 116 MG/DL
CO2 SERPL-SCNC: 29 MMOL/L (ref 20–32)
CREAT SERPL-MCNC: 0.96 MG/DL (ref 0.66–1.25)
CRP SERPL-MCNC: 5.7 MG/L (ref 0–8)
EOSINOPHIL # BLD AUTO: 0.1 10E3/UL (ref 0–0.7)
EOSINOPHIL NFR BLD AUTO: 2 %
ERYTHROCYTE [DISTWIDTH] IN BLOOD BY AUTOMATED COUNT: 13.1 % (ref 10–15)
GFR SERPL CREATININE-BSD FRML MDRD: 79 ML/MIN/1.73M2
GLUCOSE BLD-MCNC: 98 MG/DL (ref 70–99)
HCT VFR BLD AUTO: 44.2 % (ref 40–53)
HDLC SERPL-MCNC: 35 MG/DL
HGB BLD-MCNC: 15 G/DL (ref 13.3–17.7)
IMM GRANULOCYTES # BLD: 0 10E3/UL
IMM GRANULOCYTES NFR BLD: 0 %
LDLC SERPL CALC-MCNC: 60 MG/DL
LVEF ECHO: NORMAL
LYMPHOCYTES # BLD AUTO: 1.8 10E3/UL (ref 0.8–5.3)
LYMPHOCYTES NFR BLD AUTO: 37 %
MCH RBC QN AUTO: 30.8 PG (ref 26.5–33)
MCHC RBC AUTO-ENTMCNC: 33.9 G/DL (ref 31.5–36.5)
MCV RBC AUTO: 91 FL (ref 78–100)
MONOCYTES # BLD AUTO: 0.6 10E3/UL (ref 0–1.3)
MONOCYTES NFR BLD AUTO: 13 %
NEUTROPHILS # BLD AUTO: 2.2 10E3/UL (ref 1.6–8.3)
NEUTROPHILS NFR BLD AUTO: 48 %
NONHDLC SERPL-MCNC: 81 MG/DL
NRBC # BLD AUTO: 0 10E3/UL
NRBC BLD AUTO-RTO: 0 /100
PLATELET # BLD AUTO: 148 10E3/UL (ref 150–450)
POTASSIUM BLD-SCNC: 4.2 MMOL/L (ref 3.4–5.3)
PROT SERPL-MCNC: 6.6 G/DL (ref 6.8–8.8)
RBC # BLD AUTO: 4.87 10E6/UL (ref 4.4–5.9)
SODIUM SERPL-SCNC: 139 MMOL/L (ref 133–144)
TRIGL SERPL-MCNC: 107 MG/DL
TROPONIN I SERPL HS-MCNC: 4479 NG/L
UFH PPP CHRO-ACNC: 0.25 IU/ML
UFH PPP CHRO-ACNC: 0.29 IU/ML
UFH PPP CHRO-ACNC: 0.55 IU/ML
WBC # BLD AUTO: 4.7 10E3/UL (ref 4–11)

## 2022-10-01 PROCEDURE — 85520 HEPARIN ASSAY: CPT | Performed by: INTERNAL MEDICINE

## 2022-10-01 PROCEDURE — 250N000013 HC RX MED GY IP 250 OP 250 PS 637: Performed by: NURSE PRACTITIONER

## 2022-10-01 PROCEDURE — 99222 1ST HOSP IP/OBS MODERATE 55: CPT | Mod: 25 | Performed by: INTERNAL MEDICINE

## 2022-10-01 PROCEDURE — 93308 TTE F-UP OR LMTD: CPT | Mod: 26 | Performed by: INTERNAL MEDICINE

## 2022-10-01 PROCEDURE — 93321 DOPPLER ECHO F-UP/LMTD STD: CPT

## 2022-10-01 PROCEDURE — 86140 C-REACTIVE PROTEIN: CPT | Performed by: INTERNAL MEDICINE

## 2022-10-01 PROCEDURE — 85025 COMPLETE CBC W/AUTO DIFF WBC: CPT | Performed by: NURSE PRACTITIONER

## 2022-10-01 PROCEDURE — 36415 COLL VENOUS BLD VENIPUNCTURE: CPT | Performed by: INTERNAL MEDICINE

## 2022-10-01 PROCEDURE — 99233 SBSQ HOSP IP/OBS HIGH 50: CPT | Performed by: INTERNAL MEDICINE

## 2022-10-01 PROCEDURE — 93325 DOPPLER ECHO COLOR FLOW MAPG: CPT

## 2022-10-01 PROCEDURE — 84484 ASSAY OF TROPONIN QUANT: CPT | Performed by: NURSE PRACTITIONER

## 2022-10-01 PROCEDURE — 82248 BILIRUBIN DIRECT: CPT | Performed by: NURSE PRACTITIONER

## 2022-10-01 PROCEDURE — 80053 COMPREHEN METABOLIC PANEL: CPT | Performed by: NURSE PRACTITIONER

## 2022-10-01 PROCEDURE — 80061 LIPID PANEL: CPT | Performed by: NURSE PRACTITIONER

## 2022-10-01 PROCEDURE — 93321 DOPPLER ECHO F-UP/LMTD STD: CPT | Mod: 26 | Performed by: INTERNAL MEDICINE

## 2022-10-01 PROCEDURE — 210N000002 HC R&B HEART CARE

## 2022-10-01 PROCEDURE — 250N000011 HC RX IP 250 OP 636: Performed by: NURSE PRACTITIONER

## 2022-10-01 PROCEDURE — 93325 DOPPLER ECHO COLOR FLOW MAPG: CPT | Mod: 26 | Performed by: INTERNAL MEDICINE

## 2022-10-01 RX ORDER — AMOXICILLIN 250 MG
2 CAPSULE ORAL 2 TIMES DAILY PRN
Status: DISCONTINUED | OUTPATIENT
Start: 2022-10-01 | End: 2022-10-05 | Stop reason: HOSPADM

## 2022-10-01 RX ORDER — MORPHINE SULFATE 2 MG/ML
1 INJECTION, SOLUTION INTRAMUSCULAR; INTRAVENOUS
Status: DISCONTINUED | OUTPATIENT
Start: 2022-10-01 | End: 2022-10-05 | Stop reason: HOSPADM

## 2022-10-01 RX ORDER — ACETAMINOPHEN 325 MG/1
650 TABLET ORAL EVERY 6 HOURS PRN
Status: DISCONTINUED | OUTPATIENT
Start: 2022-10-01 | End: 2022-10-05 | Stop reason: HOSPADM

## 2022-10-01 RX ORDER — BISACODYL 10 MG
10 SUPPOSITORY, RECTAL RECTAL DAILY PRN
Status: DISCONTINUED | OUTPATIENT
Start: 2022-10-01 | End: 2022-10-05 | Stop reason: HOSPADM

## 2022-10-01 RX ORDER — ONDANSETRON 2 MG/ML
4 INJECTION INTRAMUSCULAR; INTRAVENOUS EVERY 6 HOURS PRN
Status: DISCONTINUED | OUTPATIENT
Start: 2022-10-01 | End: 2022-10-03

## 2022-10-01 RX ORDER — ASPIRIN 81 MG/1
81 TABLET, CHEWABLE ORAL DAILY
Status: DISCONTINUED | OUTPATIENT
Start: 2022-10-01 | End: 2022-10-03

## 2022-10-01 RX ORDER — ACETAMINOPHEN 650 MG/1
650 SUPPOSITORY RECTAL EVERY 6 HOURS PRN
Status: DISCONTINUED | OUTPATIENT
Start: 2022-10-01 | End: 2022-10-05 | Stop reason: HOSPADM

## 2022-10-01 RX ORDER — AMOXICILLIN 250 MG
1 CAPSULE ORAL 2 TIMES DAILY PRN
Status: DISCONTINUED | OUTPATIENT
Start: 2022-10-01 | End: 2022-10-05 | Stop reason: HOSPADM

## 2022-10-01 RX ORDER — ONDANSETRON 4 MG/1
4 TABLET, ORALLY DISINTEGRATING ORAL EVERY 6 HOURS PRN
Status: DISCONTINUED | OUTPATIENT
Start: 2022-10-01 | End: 2022-10-03

## 2022-10-01 RX ORDER — LIDOCAINE 40 MG/G
CREAM TOPICAL
Status: DISCONTINUED | OUTPATIENT
Start: 2022-10-01 | End: 2022-10-05 | Stop reason: HOSPADM

## 2022-10-01 RX ORDER — NITROGLYCERIN 0.4 MG/1
0.4 TABLET SUBLINGUAL EVERY 5 MIN PRN
Status: DISCONTINUED | OUTPATIENT
Start: 2022-10-01 | End: 2022-10-03

## 2022-10-01 RX ORDER — HEPARIN SODIUM 10000 [USP'U]/100ML
0-5000 INJECTION, SOLUTION INTRAVENOUS CONTINUOUS
Status: DISCONTINUED | OUTPATIENT
Start: 2022-10-01 | End: 2022-10-03

## 2022-10-01 RX ADMIN — HEPARIN SODIUM 950 UNITS/HR: 10000 INJECTION, SOLUTION INTRAVENOUS at 14:25

## 2022-10-01 RX ADMIN — ASPIRIN 81 MG CHEWABLE TABLET 81 MG: 81 TABLET CHEWABLE at 09:14

## 2022-10-01 RX ADMIN — METOPROLOL TARTRATE 12.5 MG: 25 TABLET, FILM COATED ORAL at 20:47

## 2022-10-01 RX ADMIN — VENLAFAXINE HYDROCHLORIDE 150 MG: 150 CAPSULE, EXTENDED RELEASE ORAL at 09:14

## 2022-10-01 RX ADMIN — METOPROLOL TARTRATE 12.5 MG: 25 TABLET, FILM COATED ORAL at 09:14

## 2022-10-01 RX ADMIN — PANTOPRAZOLE SODIUM 40 MG: 40 TABLET, DELAYED RELEASE ORAL at 09:14

## 2022-10-01 ASSESSMENT — ACTIVITIES OF DAILY LIVING (ADL)
ADLS_ACUITY_SCORE: 35

## 2022-10-01 NOTE — CONSULTS
[facility]  Cardiology Consultation     Date of Admission:  9/30/2022    Assessment & Plan   Candido Holliday is a 81 year old male with    1.  COVID-19 pneumonia  2.  NSTEMI   3.  Atypical chest pain  4.  Essential hypertension  5.  LDL 60 mg/dL  6.  GERD    Recommendation:   1.  Continue heparin for 48 hours.  We will perform a Lexiscan whenever stable.  Since symptoms are atypical and there is no wall motion abnormality on echocardiogram or ischemic changes on EKG I think it is reasonable to perform a noninvasive test to decide whether he needs a coronary angiogram.  2.  Continue current medications including aspirin and atenolol.    Ar Pinto MD    Code Status    Prior    Reason for Consult   Reason for consult: Elevated troponin    Primary Care Physician   Enrique Truong    Chief Complaint   Chest pain    History of Present Illness   Candido Holliday is a 81 year old male with past medical history of obstructive sleep apnea, hypertension, depression and GERD who presents to the hospital for complaints of epigastric and substernal pain.  This is associated with weakness, dizziness and shortness of breath.  No exacerbating or relieving factors.  Each episodes last 5 to 10 minutes and subside spontaneously.  He has never noticed these symptoms in the past.  Symptoms started 4-5 days ago and have progressed.  Denies fevers, cough, sick contacts.    In the emergency department initial blood work was unremarkable.  CT chest negative for PE but show calcification noted in the LAD territory.  EKG showed sinus rhythm with first-degree AV block and RBBB and no ischemic ST-T wave changes.  Initial troponin elevated 4600 and peaked at 5400 and now downtrending.  He also underwent a transthoracic echocardiogram which showed normal LV size and systolic function, no significant valvular disease and no regional wall motion abnormality although contrast was not used and visualization of anterior  wall was somewhat suboptimal.  He later tested positive for COVID 19.    Of note, the patient previously had a stress test done in 2008 and 2014.  The one in 2008 was a Lexiscan 1/9/2014 was a stress echocardiogram.  Both these tests were negative for ischemia.    Patient Active Problem List   Diagnosis     Cervical dystonia     NSTEMI (non-ST elevated myocardial infarction) (H)     Shortness of breath     Weakness     Syncope     Depression     Anxiety       Past Medical History   I have reviewed this patient's medical history and updated it with pertinent information if needed.   Past Medical History:   Diagnosis Date     Depressive disorder      Gastro-oesophageal reflux disease        Past Surgical History   I have reviewed this patient's surgical history and updated it with pertinent information if needed.  Past Surgical History:   Procedure Laterality Date     CV CORONARY ANGIOGRAM N/A 10/3/2022    Procedure: Coronary Angiogram;  Surgeon: Jasmeet Lopez MD;  Location: [facility] CARDIAC CATH LAB     CV FRACTIONAL FLOW RATIO WIRE N/A 10/3/2022    Procedure: Fractional Flow Ratio Wire;  Surgeon: Jasmeet Lopez MD;  Location: [facility] CARDIAC CATH LAB     CV INTRAVASULAR ULTRASOUND N/A 10/3/2022    Procedure: Intravascular Ultrasound;  Surgeon: Jasmeet Lopez MD;  Location: [facility] CARDIAC CATH LAB     CV PCI N/A 10/3/2022    Procedure: Percutaneous Coronary Intervention;  Surgeon: Jasmeet Lopez MD;  Location: [facility] CARDIAC CATH LAB     ORTHOPEDIC SURGERY         Prior to Admission Medications   Prior to Admission Medications   Prescriptions Last Dose Informant Patient Reported? Taking?   ASPIRIN 81 MG OR TABS 9/30/2022 at am Self Yes No   Sig: Take 81 mg by mouth daily   Ascorbic Acid (VITAMIN C PO) 9/30/2022 at am Self Yes Yes   Sig: Take 1 tablet by mouth daily   VITAMIN D PO 9/30/2022 at am Self Yes Yes   Sig: Take 1 tablet by mouth daily   atenolol (TENORMIN) 25 MG  tablet 9/30/2022 at am Self Yes No   Sig: Take 25 mg by mouth daily   meloxicam (MOBIC) 15 MG tablet 9/30/2022 at am Self Yes Yes   Sig: Take 15 mg by mouth daily   omeprazole (PRILOSEC) 40 MG DR capsule 9/30/2022 at am Self Yes Yes   Sig: Take 40 mg by mouth daily   tamsulosin (FLOMAX) 0.4 MG capsule 9/30/2022 at am Self Yes Yes   Sig: Take 0.4 mg by mouth daily   venlafaxine (EFFEXOR XR) 150 MG 24 hr capsule 9/30/2022 at am Self Yes Yes   Sig: Take 150 mg by mouth daily      Facility-Administered Medications: None         Allergies   No Known Allergies    Social History    reports that he has never smoked. He does not have any smokeless tobacco history on file. He reports current alcohol use. He reports that he does not use drugs.    Family History   No FH of sudden death or premature CAD    Review of Systems   The comprehensive review of Systems is negative other than noted in the HPI or here.     Physical Exam                     Vital Signs with Ranges     191 lbs 0 oz    Constitutional:  HEENT: No apparent distress  Normocephalic, atraumatic, Normal eyes, throat normal.    Lungs: Normal bilateral breath sounds   Cardiovascular: Regular rate and rhythm, normal S1 and S2, and no murmur   GI:   Lymph node  Neck No jaundice, no ascitis, no palpable hepatospleenomeagaly  No enlargement  No jugular vein extension or carotid bruit   Skin: Normal   Extremity:  Neurological:  Psych: No edema, bilateral equal pulses  AAOX3  Normal affect       Data   No results found for this or any previous visit (from the past 24 hour(s)).

## 2022-10-01 NOTE — PHARMACY-ADMISSION MEDICATION HISTORY
Pharmacy Medication History  Admission medication history interview status for the 9/30/2022  admission is complete. See EPIC admission navigator for prior to admission medications     Location of Interview: Patient room  Medication history sources: Patient and fill history     Significant changes made to the medication list:    Removed- Prevacid, Xanax, Effexor 75 mg   Added-  All meds to the list       In the past week, patient estimated taking medication this percent of the time: greater than 90%    Additional medication history information:     Patient is a good historian. His report is consistent with fill history.     Medication reconciliation completed by provider prior to medication history? Yes    Time spent in this activity: 15 minutes     Prior to Admission medications    Medication Sig Last Dose Taking? Auth Provider Long Term End Date   Ascorbic Acid (VITAMIN C PO) Take 1 tablet by mouth daily 9/30/2022 at am Yes Unknown, Entered By History     ASPIRIN 81 MG OR TABS Take 81 mg by mouth daily 9/30/2022 at am Yes Reported, Patient     atenolol (TENORMIN) 25 MG tablet Take 25 mg by mouth daily 9/30/2022 at am Yes Unknown, Entered By History Yes    meloxicam (MOBIC) 15 MG tablet Take 15 mg by mouth daily 9/30/2022 at am Yes Unknown, Entered By History Yes    omeprazole (PRILOSEC) 40 MG DR capsule Take 40 mg by mouth daily 9/30/2022 at am Yes Unknown, Entered By History     tamsulosin (FLOMAX) 0.4 MG capsule Take 0.4 mg by mouth daily 9/30/2022 at am Yes Unknown, Entered By History     venlafaxine (EFFEXOR XR) 150 MG 24 hr capsule Take 150 mg by mouth daily 9/30/2022 at am Yes Unknown, Entered By History     VITAMIN D PO Take 1 tablet by mouth daily 9/30/2022 at am Yes Unknown, Entered By History         The information provided in this note is only as accurate as the sources available at the time of update(s)   Nita Connolly, RezaD

## 2022-10-01 NOTE — PROGRESS NOTES
[facility]    Hospitalist Progress Note    Interval History   - Feels well today, chest pain improved. Reports he has been feeling off for about two weeks, he had morning chills, but felt fine in the afternoon. His wife got sick about a week ago, and has mostly recovered.  - Monitor until tomorrow, cardiac plan per Cards    Assessment & Plan   Summary: Candido Holliday is a 81 year old male with PMH GERD, depression, anxiety and SARITA who was admitted on 9/30/2022 with epigastric, lower sternal chest pain, weakness and dizziness that has progressed over the past 4-5 days with recent syncopal event.    NSTEMI versus type 2 MI  Syncope  Admit with vague, but progressive symptoms that have been ongoing for the past several weeks and have worsened over the last 4-5 days, including SOB, dizziness, weakness, loss of appetite, and patient reported syncopal episode 4 days ago accompanied by dizziness and diaphoresis.   No personal or past family history of HTN, HLD or heart disease. Never a smoker and rare alcohol use. ECG with SR 1AVB and RBBB without obvious signs of ischemia.Troponin elevated at 4627-->5430-->4479. He was initiated on ASA and heparin infusion in the ED. He was admitted for Cardiology consult and further work-up.    Differential includes ACS, myocarditis, pericarditis, type 2 MI.  - Appreciate Cardiology consult   - Heparin drip  - Lipid panel shows low HDL  - Echocardiogrma pending  - ASA  - Continue metoprolol started this admission  - Continue cardiac monitoring    COVID-19  Elevated LFT's  Patient's vague presenting complaints could reflect COVID-19 infection. COVID-19 PCR positive on admission 9/30/2022. Patient reports up to two weeks of symptoms prior to admission--along with slightly elevated LFTs no clear role for remdesivir here. CT chest no lung infiltrates.  - Trend AST/ALT  - Trend CRP due to COVID-19    Depression  Anxiety  During exam, generalized shaking and endorsing  significant anxiety  - PTA regimen: Effexor 150 mg daily  - Alprazolam: takes alprazolam that he states that he has had for the past 15 + years and rarely uses; has taken one tablet daily for the past 4 days due to symptoms noted above that he believed were related to anxiety  - Resume Effexor    SARITA  -CPAP at night    DVT Prophylaxis: Heparin drip  Code Status: Full Code  PT/OT: not needed  Diet: Combination Diet Low Saturated Fat Na <2400mg Diet, No Caffeine Diet      Disposition: Expected discharge 1-2 days pending cardiac workup    Higinio Camargo MD, MD  Text Page  (7am to 6pm)  -Data reviewed today: I reviewed all new labs and imaging results over the last 24 hours.    Physical Exam   Temp: 98.9  F (37.2  C) Temp src: Oral BP: (!) 140/83 Pulse: 71   Resp: 18 SpO2: 95 % O2 Device: None (Room air)    Vitals:    09/30/22 1329 10/01/22 0016   Weight: 95.3 kg (210 lb) 89.9 kg (198 lb 3.2 oz)     Vital Signs with Ranges  Temp:  [98.5  F (36.9  C)-98.9  F (37.2  C)] 98.9  F (37.2  C)  Pulse:  [71-74] 71  Resp:  [18-24] 18  BP: (124-149)/(81-98) 140/83  Cuff Mean (mmHg):  [104] 104  SpO2:  [95 %-96 %] 95 %  I/O last 3 completed shifts:  In: 240 [P.O.:240]  Out: 400 [Urine:400]  O2 requirements: none    Constitutional: Male in NAD  HEENT: Eyes nonicteric, oral mucosa moist  Cardiovascular: RRR, normal S1/2, no m/r/g  Respiratory: CTAB, no wheezing or crackles  Vascular: No LE pitting edema  GI: Normoactive bowel sounds, nontender, nondistended  Skin/Integumen: No rashes  Neuro/Psych: Appropriate affect and mood. A&Ox3, moves all extremities    Medications     heparin 950 Units/hr (10/01/22 6511)     - MEDICATION INSTRUCTIONS -       - MEDICATION INSTRUCTIONS -       ACE/ARB/ARNI NOT PRESCRIBED       STATIN NOT PRESCRIBED         aspirin  81 mg Oral Daily     metoprolol tartrate  12.5 mg Oral BID     pantoprazole  40 mg Oral QAM AC     sodium chloride (PF)  3 mL Intracatheter Q8H     venlafaxine  150 mg Oral Daily        Data   Recent Labs   Lab 10/01/22  0628 09/30/22  2239 09/30/22  1335   WBC 4.7 5.1 4.2   HGB 15.0 14.3 14.9   MCV 91 91 94   * 145* 144*   INR  --   --  1.01     --  136   POTASSIUM 4.2  --  4.3   CHLORIDE 105  --  102   CO2 29  --  29   BUN 17  --  19   CR 0.96  --  1.07   ANIONGAP 5  --  5   BHARTI 8.6  --  8.5   GLC 98  --  97   ALBUMIN 3.1*  --  3.0*   PROTTOTAL 6.6*  --  7.1   BILITOTAL 1.3  --  0.9   ALKPHOS 46  --  53   ALT 69  --  79*   AST 51*  --  67*   LIPASE  --   --  133       Imaging:   Recent Results (from the past 24 hour(s))   CT Chest Pulmonary Embolism w Contrast    Narrative    EXAM: CT CHEST PULMONARY EMBOLISM W CONTRAST  LOCATION: [facility]  DATE/TIME: 9/30/2022 5:12 PM    INDICATION: Chest pain, elevated D-dimer.    COMPARISON: None.    TECHNIQUE: CT chest pulmonary angiogram during arterial phase injection of IV contrast. Multiplanar reformats and MIP reconstructions were performed. Dose reduction techniques were used.     CONTRAST: 75 mL Isovue 370.    FINDINGS:  ANGIOGRAM CHEST: Pulmonary arteries are normal caliber and negative for pulmonary emboli. Thoracic aorta is negative for dissection. No CT evidence of right heart strain.    LUNGS AND PLEURA: Elevated right hemidiaphragm. Trace dependent probable atelectasis versus less likely infiltrate. No effusions.    MEDIASTINUM/AXILLAE: No adenopathy or aneurysm.    CORONARY ARTERY CALCIFICATION: Moderate.    UPPER ABDOMEN: No acute findings.    MUSCULOSKELETAL: No frankly destructive bony lesions.      Impression    IMPRESSION:  1.  No pulmonary embolism demonstrated.  2.  Trace peripheral probable atelectasis versus less likely infiltrate.  3.  Elevated right hemidiaphragm.

## 2022-10-01 NOTE — PROGRESS NOTES
.RECEIVING UNIT ED HANDOFF REVIEW    ED Nurse Handoff Report was reviewed by: Alecia Ly RN on September 30, 2022 at 11:42 PM

## 2022-10-02 LAB
ALBUMIN SERPL-MCNC: 3.1 G/DL (ref 3.4–5)
ALP SERPL-CCNC: 47 U/L (ref 40–150)
ALT SERPL W P-5'-P-CCNC: 66 U/L (ref 0–70)
ANION GAP SERPL CALCULATED.3IONS-SCNC: 5 MMOL/L (ref 3–14)
AST SERPL W P-5'-P-CCNC: 49 U/L (ref 0–45)
BASOPHILS # BLD AUTO: 0 10E3/UL (ref 0–0.2)
BASOPHILS NFR BLD AUTO: 1 %
BILIRUB SERPL-MCNC: 1.1 MG/DL (ref 0.2–1.3)
BUN SERPL-MCNC: 17 MG/DL (ref 7–30)
CALCIUM SERPL-MCNC: 8.7 MG/DL (ref 8.5–10.1)
CHLORIDE BLD-SCNC: 105 MMOL/L (ref 94–109)
CO2 SERPL-SCNC: 28 MMOL/L (ref 20–32)
CREAT SERPL-MCNC: 0.93 MG/DL (ref 0.66–1.25)
CRP SERPL-MCNC: 4.9 MG/L (ref 0–8)
EOSINOPHIL # BLD AUTO: 0.1 10E3/UL (ref 0–0.7)
EOSINOPHIL NFR BLD AUTO: 2 %
ERYTHROCYTE [DISTWIDTH] IN BLOOD BY AUTOMATED COUNT: 13.1 % (ref 10–15)
GFR SERPL CREATININE-BSD FRML MDRD: 82 ML/MIN/1.73M2
GLUCOSE BLD-MCNC: 101 MG/DL (ref 70–99)
HCT VFR BLD AUTO: 47.5 % (ref 40–53)
HGB BLD-MCNC: 15.7 G/DL (ref 13.3–17.7)
IMM GRANULOCYTES # BLD: 0 10E3/UL
IMM GRANULOCYTES NFR BLD: 0 %
LYMPHOCYTES # BLD AUTO: 1.5 10E3/UL (ref 0.8–5.3)
LYMPHOCYTES NFR BLD AUTO: 33 %
MCH RBC QN AUTO: 30.8 PG (ref 26.5–33)
MCHC RBC AUTO-ENTMCNC: 33.1 G/DL (ref 31.5–36.5)
MCV RBC AUTO: 93 FL (ref 78–100)
MONOCYTES # BLD AUTO: 0.7 10E3/UL (ref 0–1.3)
MONOCYTES NFR BLD AUTO: 14 %
NEUTROPHILS # BLD AUTO: 2.4 10E3/UL (ref 1.6–8.3)
NEUTROPHILS NFR BLD AUTO: 50 %
NRBC # BLD AUTO: 0 10E3/UL
NRBC BLD AUTO-RTO: 0 /100
PLATELET # BLD AUTO: 174 10E3/UL (ref 150–450)
POTASSIUM BLD-SCNC: 4.1 MMOL/L (ref 3.4–5.3)
PROT SERPL-MCNC: 6.7 G/DL (ref 6.8–8.8)
RBC # BLD AUTO: 5.09 10E6/UL (ref 4.4–5.9)
SODIUM SERPL-SCNC: 138 MMOL/L (ref 133–144)
WBC # BLD AUTO: 4.7 10E3/UL (ref 4–11)

## 2022-10-02 PROCEDURE — 250N000013 HC RX MED GY IP 250 OP 250 PS 637: Performed by: NURSE PRACTITIONER

## 2022-10-02 PROCEDURE — 86140 C-REACTIVE PROTEIN: CPT | Performed by: INTERNAL MEDICINE

## 2022-10-02 PROCEDURE — 99232 SBSQ HOSP IP/OBS MODERATE 35: CPT | Performed by: INTERNAL MEDICINE

## 2022-10-02 PROCEDURE — 250N000013 HC RX MED GY IP 250 OP 250 PS 637: Performed by: INTERNAL MEDICINE

## 2022-10-02 PROCEDURE — 210N000002 HC R&B HEART CARE

## 2022-10-02 PROCEDURE — 80053 COMPREHEN METABOLIC PANEL: CPT | Performed by: INTERNAL MEDICINE

## 2022-10-02 PROCEDURE — 99233 SBSQ HOSP IP/OBS HIGH 50: CPT | Performed by: INTERNAL MEDICINE

## 2022-10-02 PROCEDURE — 36415 COLL VENOUS BLD VENIPUNCTURE: CPT | Performed by: INTERNAL MEDICINE

## 2022-10-02 PROCEDURE — 85025 COMPLETE CBC W/AUTO DIFF WBC: CPT | Performed by: INTERNAL MEDICINE

## 2022-10-02 PROCEDURE — 250N000011 HC RX IP 250 OP 636: Performed by: NURSE PRACTITIONER

## 2022-10-02 RX ORDER — METOPROLOL TARTRATE 25 MG/1
25 TABLET, FILM COATED ORAL 2 TIMES DAILY
Status: DISCONTINUED | OUTPATIENT
Start: 2022-10-02 | End: 2022-10-04

## 2022-10-02 RX ADMIN — VENLAFAXINE HYDROCHLORIDE 150 MG: 150 CAPSULE, EXTENDED RELEASE ORAL at 08:42

## 2022-10-02 RX ADMIN — ASPIRIN 81 MG CHEWABLE TABLET 81 MG: 81 TABLET CHEWABLE at 08:42

## 2022-10-02 RX ADMIN — METOPROLOL TARTRATE 12.5 MG: 25 TABLET, FILM COATED ORAL at 08:42

## 2022-10-02 RX ADMIN — METOPROLOL TARTRATE 25 MG: 25 TABLET, FILM COATED ORAL at 21:19

## 2022-10-02 RX ADMIN — PANTOPRAZOLE SODIUM 40 MG: 40 TABLET, DELAYED RELEASE ORAL at 08:42

## 2022-10-02 RX ADMIN — HEPARIN SODIUM 950 UNITS/HR: 10000 INJECTION, SOLUTION INTRAVENOUS at 16:15

## 2022-10-02 ASSESSMENT — ACTIVITIES OF DAILY LIVING (ADL)
ADLS_ACUITY_SCORE: 35

## 2022-10-02 NOTE — PLAN OF CARE
Bari is alert, oriented, pleasant, anxious. He denies chest pain or shortness of breath. Minimal activity today; up independently. Heparin infusing. Plan for Lexiscan. Updated spouse Amelia.

## 2022-10-02 NOTE — CONSULTS
"Care Management Initial Consult    General Information  Assessment completed with: Patient, \"Bari\"     Primary Care Provider verified and updated as needed: Yes   Readmission within the last 30 days: no previous admission in last 30 days         Advance Care Planning: Advance Care Planning Reviewed: no concerns identified        Communication Assessment  Patient's communication style: spoken language (English or Bilingual)        Cognitive  Cognitive/Neuro/Behavioral: WDL                      Living Environment:   People in home: spouse  Gely  Current living Arrangements: house      Able to return to prior arrangements: yes     Family/Social Support:  Care provided by: self  Provides care for: no one  Marital Status:   Wife, Children  gely       Description of Support System: Supportive, Involved    Support Assessment: Adequate social supports, Adequate family and caregiver support    Current Resources:   Patient receiving home care services: No     Community Resources: None  Equipment currently used at home: none  Supplies currently used at home: None    Employment/Financial:  Employment Status: retired     Financial Concerns:   n/a    Lifestyle & Psychosocial Needs:  Social Determinants of Health     Tobacco Use: Not on file   Alcohol Use: Not on file   Financial Resource Strain: Not on file   Food Insecurity: Not on file   Transportation Needs: Not on file   Physical Activity: Not on file   Stress: Not on file   Social Connections: Not on file   Intimate Partner Violence: Not on file   Depression: Not on file   Housing Stability: Not on file     Functional Status:  Prior to admission patient needed assistance: none  Mental Health Status:  Chemical Dependency Status:  Values/Beliefs:  Spiritual, Cultural Beliefs, Jew Practices, Values that affect care:               Additional Information:  Consult for discharge planning. Patient admitted on 9/30/2022 for lower sternal chest pain and a " tentative discharge date of today. Writer reviewed chart and TCU recommendations at discharge. Writer met with patient and via phone and introduced self and role. Writer confirmed patient's primary doctor is Enrique Truong. Patient lives in a three bedroom rambler with spouse. Patient has a cane but doesn't need/use it. Patient stated his plan is go to home once medically cleared and his wife will pick him up from hospital.    Amanda Machuca, MSW, LGSW

## 2022-10-02 NOTE — PROGRESS NOTES
[facility]    Hospitalist Progress Note    Interval History   - Patient feels well essentially asymptomatic now. Continue Heparni drip today and Lexiscan tomorrow, possible discharge tomorrow.     Assessment & Plan   Summary: Candido Holliday is a 81 year old male with PMH GERD, depression, anxiety and SARITA who was admitted on 9/30/2022 with epigastric, lower sternal chest pain, weakness and dizziness that has progressed over the past 4-5 days with recent syncopal event.    NSTEMI versus type 2 MI  Syncope  Admit with vague, but progressive symptoms that have been ongoing for the past several weeks and have worsened over the last 4-5 days, including SOB, dizziness, weakness, loss of appetite, and patient reported syncopal episode 4 days ago accompanied by dizziness and diaphoresis.   No personal or past family history of HTN, HLD or heart disease. Never a smoker and rare alcohol use. ECG with SR 1AVB and RBBB without obvious signs of ischemia.Troponin elevated at 4627-->5430-->4479. He was initiated on ASA and heparin infusion in the ED. He was admitted for Cardiology consult and further work-up.    Differential includes ACS, myocarditis, pericarditis, type 2 MI.  - Appreciate Cardiology consult   - Heparin drip   - Lexiscan tomorrow  - Lipid panel shows low HDL  - Echocardiogram results pending  - ASA  - Continue metoprolol started this admission  - Continue cardiac monitoring    COVID-19  Elevated LFT's  Patient's vague presenting complaints could reflect COVID-19 infection. COVID-19 PCR positive on admission 9/30/2022. Patient reports up to two weeks of symptoms prior to admission--along with slightly elevated LFTs no clear role for remdesivir here. CT chest no lung infiltrates.  - Trend AST/ALT--improving 10/2  - Trend CRP due to COVID-19    Depression  Anxiety  During exam, generalized shaking and endorsing significant anxiety  - PTA regimen: Effexor 150 mg daily  - Alprazolam: takes  alprazolam that he states that he has had for the past 15 + years and rarely uses; has taken one tablet daily for the past 4 days due to symptoms noted above that he believed were related to anxiety  - Resume Effexor    SARITA  -CPAP at night    DVT Prophylaxis: Heparin drip  Code Status: Full Code  PT/OT: not needed  Diet: Combination Diet Low Saturated Fat Na <2400mg Diet, No Caffeine Diet      Disposition: Expected discharge 1-2 days pending cardiac workup    Higinio Camargo MD, MD  Text Page  (7am to 6pm)  -Data reviewed today: I reviewed all new labs and imaging results over the last 24 hours.    Physical Exam   Temp: 98  F (36.7  C) Temp src: Oral BP: (!) 151/89 Pulse: 70   Resp: 18 SpO2: 95 % O2 Device: None (Room air)    Vitals:    09/30/22 1329 10/01/22 0016 10/02/22 0632   Weight: 95.3 kg (210 lb) 89.9 kg (198 lb 3.2 oz) 88.2 kg (194 lb 6.4 oz)     Vital Signs with Ranges  Temp:  [98  F (36.7  C)-98.2  F (36.8  C)] 98  F (36.7  C)  Pulse:  [65-70] 70  Resp:  [18] 18  BP: (143-151)/(80-89) 151/89  SpO2:  [95 %-96 %] 95 %  I/O last 3 completed shifts:  In: 540 [P.O.:540]  Out: 1500 [Urine:1500]  O2 requirements: none    Constitutional: Male in NAD  HEENT: Eyes nonicteric, oral mucosa moist  Cardiovascular: RRR, normal S1/2, no m/r/g  Respiratory: CTAB, no wheezing or crackles  Vascular: No LE pitting edema  GI: Normoactive bowel sounds, nontender, nondistended  Skin/Integumen: No rashes  Neuro/Psych: Appropriate affect and mood. A&Ox3, moves all extremities    Medications     heparin 950 Units/hr (10/01/22 1425)     - MEDICATION INSTRUCTIONS -       - MEDICATION INSTRUCTIONS -       ACE/ARB/ARNI NOT PRESCRIBED       STATIN NOT PRESCRIBED         aspirin  81 mg Oral Daily     metoprolol tartrate  12.5 mg Oral BID     pantoprazole  40 mg Oral QAM AC     sodium chloride (PF)  3 mL Intracatheter Q8H     venlafaxine  150 mg Oral Daily       Data   Recent Labs   Lab 10/02/22  0644 10/01/22  0628 09/30/22  7006  09/30/22  1335   WBC 4.7 4.7 5.1 4.2   HGB 15.7 15.0 14.3 14.9   MCV 93 91 91 94    148* 145* 144*   INR  --   --   --  1.01    139  --  136   POTASSIUM 4.1 4.2  --  4.3   CHLORIDE 105 105  --  102   CO2 28 29  --  29   BUN 17 17  --  19   CR 0.93 0.96  --  1.07   ANIONGAP 5 5  --  5   BHARTI 8.7 8.6  --  8.5   * 98  --  97   ALBUMIN 3.1* 3.1*  --  3.0*   PROTTOTAL 6.7* 6.6*  --  7.1   BILITOTAL 1.1 1.3  --  0.9   ALKPHOS 47 46  --  53   ALT 66 69  --  79*   AST 49* 51*  --  67*   LIPASE  --   --   --  133       Imaging:   No results found for this or any previous visit (from the past 24 hour(s)).

## 2022-10-02 NOTE — PLAN OF CARE
Bari continues to deny chest pain or dyspnea. He is calm, cooperative, slightly anxious, and very pleasant. Heparin infusing. Covid precautions maintained. Plan for Lexiscan tomorrow, possible discharge.

## 2022-10-02 NOTE — PROGRESS NOTES
[facility]    Cardiology Progress Note     Assessment & Plan   Candido Holliday is a 81 year old male who was admitted on 9/30/2022.  1.  COVID-19 pneumonia  2.  NSTEMI   3.  Atypical chest pain  4.  Essential hypertension  5.  LDL 60 mg/dL  6.  GERD    Recommendation:   1.  Pending Lexiscan tomorrow.  2.  Uptitrate metoprolol to 25 mg bid.    Ar Pinto MD  Text Page (7am - 5pm, M-F)    Interval History   Bp slightly elevated    Physical Exam   Temp: 98  F (36.7  C) Temp src: Oral BP: (!) 151/89 Pulse: 70   Resp: 18 SpO2: 95 % O2 Device: None (Room air)    Vitals:    09/30/22 1329 10/01/22 0016 10/02/22 0632   Weight: 95.3 kg (210 lb) 89.9 kg (198 lb 3.2 oz) 88.2 kg (194 lb 6.4 oz)     Vital Signs with Ranges  Temp:  [98  F (36.7  C)-98.2  F (36.8  C)] 98  F (36.7  C)  Pulse:  [65-70] 70  Resp:  [18] 18  BP: (143-151)/(80-89) 151/89  SpO2:  [95 %-96 %] 95 %  I/O last 3 completed shifts:  In: 540 [P.O.:540]  Out: 1500 [Urine:1500]  Patient Active Problem List   Diagnosis     Cervical dystonia     NSTEMI (non-ST elevated myocardial infarction) (H)     Shortness of breath     Weakness     Syncope     Depression     Anxiety       Constitutional:  HEENT: Alert, no apparent distress  Normocephalic, atraumatic   Lungs: Normal bilateral breath sounds   Cardiovascular: Regular rate and rhythm, normal S1 and S2, and no murmur   GI:   Lymph node  Neck No jaundice, no ascitis, no palpable hepatospleenomeagaly  No enlargement  No jugular vein extension or carotid bruit   Skin: Normal   Extremity:  Neurological:  Psych: No edema  AAOX3  Alert and oriented x3       Medications     heparin 950 Units/hr (10/01/22 1425)     - MEDICATION INSTRUCTIONS -       - MEDICATION INSTRUCTIONS -       ACE/ARB/ARNI NOT PRESCRIBED       STATIN NOT PRESCRIBED         aspirin  81 mg Oral Daily     metoprolol tartrate  12.5 mg Oral BID     pantoprazole  40 mg Oral QAM AC     sodium chloride (PF)  3 mL Intracatheter Q8H      venlafaxine  150 mg Oral Daily       Data   Results for orders placed or performed during the hospital encounter of 09/30/22 (from the past 24 hour(s))   Heparin Unfractionated Anti Xa Level   Result Value Ref Range    Anti Xa Unfractionated Heparin 0.25 For Reference Range, See Comment IU/mL    Narrative    Therapeutic Range: UFH: 0.25-0.50 IU/mL for low intensity dosing,  0.30-0.70 IU/mL for high intensity dosing DVT and PE.  This test is not validated for other direct factor X inhibitors (e.g. rivaroxaban, apixaban, edoxaban, betrixaban, fondaparinux) and should not be used for monitoring of other medications.   CBC with platelets differential    Narrative    The following orders were created for panel order CBC with platelets differential.  Procedure                               Abnormality         Status                     ---------                               -----------         ------                     CBC with platelets and d...[[id number]]                      Final result                 Please view results for these tests on the individual orders.   CRP inflammation   Result Value Ref Range    CRP Inflammation 4.9 0.0 - 8.0 mg/L   Comprehensive metabolic panel   Result Value Ref Range    Sodium 138 133 - 144 mmol/L    Potassium 4.1 3.4 - 5.3 mmol/L    Chloride 105 94 - 109 mmol/L    Carbon Dioxide (CO2) 28 20 - 32 mmol/L    Anion Gap 5 3 - 14 mmol/L    Urea Nitrogen 17 7 - 30 mg/dL    Creatinine 0.93 0.66 - 1.25 mg/dL    Calcium 8.7 8.5 - 10.1 mg/dL    Glucose 101 (H) 70 - 99 mg/dL    Alkaline Phosphatase 47 40 - 150 U/L    AST 49 (H) 0 - 45 U/L    ALT 66 0 - 70 U/L    Protein Total 6.7 (L) 6.8 - 8.8 g/dL    Albumin 3.1 (L) 3.4 - 5.0 g/dL    Bilirubin Total 1.1 0.2 - 1.3 mg/dL    GFR Estimate 82 >60 mL/min/1.73m2   CBC with platelets and differential   Result Value Ref Range    WBC Count 4.7 4.0 - 11.0 10e3/uL    RBC Count 5.09 4.40 - 5.90 10e6/uL    Hemoglobin 15.7 13.3 - 17.7 g/dL    Hematocrit  47.5 40.0 - 53.0 %    MCV 93 78 - 100 fL    MCH 30.8 26.5 - 33.0 pg    MCHC 33.1 31.5 - 36.5 g/dL    RDW 13.1 10.0 - 15.0 %    Platelet Count 174 150 - 450 10e3/uL    % Neutrophils 50 %    % Lymphocytes 33 %    % Monocytes 14 %    % Eosinophils 2 %    % Basophils 1 %    % Immature Granulocytes 0 %    NRBCs per 100 WBC 0 <1 /100    Absolute Neutrophils 2.4 1.6 - 8.3 10e3/uL    Absolute Lymphocytes 1.5 0.8 - 5.3 10e3/uL    Absolute Monocytes 0.7 0.0 - 1.3 10e3/uL    Absolute Eosinophils 0.1 0.0 - 0.7 10e3/uL    Absolute Basophils 0.0 0.0 - 0.2 10e3/uL    Absolute Immature Granulocytes 0.0 <=0.4 10e3/uL    Absolute NRBCs 0.0 10e3/uL

## 2022-10-02 NOTE — PLAN OF CARE
Pt here with intermittent, nonradiating localized central lower chest/upper abdominal pain with associated shortness of breath and decreased appetite over the last few days. A&Ox4. Neuros and CMS intact. VSS. Tele SR with 1st degree AVB. NPO diet. Up with SBA. Denies pain. Pt scoring green on the Aggression Stop Light Tool. Plan for lexiscan when stable. Discharge pending.

## 2022-10-03 ENCOUNTER — APPOINTMENT (OUTPATIENT)
Dept: CARDIOLOGY | Facility: CLINIC | Age: 82
DRG: 246 | End: 2022-10-03
Attending: INTERNAL MEDICINE
Payer: MEDICARE

## 2022-10-03 LAB
ACT BLD: 271 SECONDS (ref 74–150)
ACT BLD: 301 SECONDS (ref 74–150)
ACT BLD: 318 SECONDS (ref 74–150)
ANION GAP SERPL CALCULATED.3IONS-SCNC: 6 MMOL/L (ref 3–14)
BUN SERPL-MCNC: 18 MG/DL (ref 7–30)
CALCIUM SERPL-MCNC: 8.9 MG/DL (ref 8.5–10.1)
CHLORIDE BLD-SCNC: 106 MMOL/L (ref 94–109)
CHOLEST SERPL-MCNC: 132 MG/DL
CO2 SERPL-SCNC: 27 MMOL/L (ref 20–32)
CREAT SERPL-MCNC: 1.04 MG/DL (ref 0.66–1.25)
CRP SERPL-MCNC: 3.9 MG/L (ref 0–8)
ERYTHROCYTE [DISTWIDTH] IN BLOOD BY AUTOMATED COUNT: 13.2 % (ref 10–15)
GFR SERPL CREATININE-BSD FRML MDRD: 72 ML/MIN/1.73M2
GLUCOSE BLD-MCNC: 111 MG/DL (ref 70–99)
HCT VFR BLD AUTO: 48.1 % (ref 40–53)
HDLC SERPL-MCNC: 40 MG/DL
HGB BLD-MCNC: 15.9 G/DL (ref 13.3–17.7)
LDLC SERPL CALC-MCNC: 66 MG/DL
MAGNESIUM SERPL-MCNC: 2.4 MG/DL (ref 1.6–2.3)
MCH RBC QN AUTO: 30.9 PG (ref 26.5–33)
MCHC RBC AUTO-ENTMCNC: 33.1 G/DL (ref 31.5–36.5)
MCV RBC AUTO: 94 FL (ref 78–100)
NONHDLC SERPL-MCNC: 92 MG/DL
PLATELET # BLD AUTO: 204 10E3/UL (ref 150–450)
POTASSIUM BLD-SCNC: 4.3 MMOL/L (ref 3.4–5.3)
RBC # BLD AUTO: 5.14 10E6/UL (ref 4.4–5.9)
SODIUM SERPL-SCNC: 139 MMOL/L (ref 133–144)
TRIGL SERPL-MCNC: 131 MG/DL
UFH PPP CHRO-ACNC: 0.26 IU/ML
WBC # BLD AUTO: 5.1 10E3/UL (ref 4–11)

## 2022-10-03 PROCEDURE — C9600 PERC DRUG-EL COR STENT SING: HCPCS | Mod: LC | Performed by: INTERNAL MEDICINE

## 2022-10-03 PROCEDURE — 4A033BC MEASUREMENT OF ARTERIAL PRESSURE, CORONARY, PERCUTANEOUS APPROACH: ICD-10-PCS | Performed by: INTERNAL MEDICINE

## 2022-10-03 PROCEDURE — C9601 PERC DRUG-EL COR STENT BRAN: HCPCS | Performed by: INTERNAL MEDICINE

## 2022-10-03 PROCEDURE — 92979 ENDOLUMINL IVUS OCT C EA: CPT | Performed by: INTERNAL MEDICINE

## 2022-10-03 PROCEDURE — 93571 IV DOP VEL&/PRESS C FLO 1ST: CPT | Mod: 26 | Performed by: INTERNAL MEDICINE

## 2022-10-03 PROCEDURE — 027134Z DILATION OF CORONARY ARTERY, TWO ARTERIES WITH DRUG-ELUTING INTRALUMINAL DEVICE, PERCUTANEOUS APPROACH: ICD-10-PCS | Performed by: INTERNAL MEDICINE

## 2022-10-03 PROCEDURE — 93010 ELECTROCARDIOGRAM REPORT: CPT | Mod: 76 | Performed by: INTERNAL MEDICINE

## 2022-10-03 PROCEDURE — 80061 LIPID PANEL: CPT | Performed by: STUDENT IN AN ORGANIZED HEALTH CARE EDUCATION/TRAINING PROGRAM

## 2022-10-03 PROCEDURE — 250N000013 HC RX MED GY IP 250 OP 250 PS 637: Performed by: INTERNAL MEDICINE

## 2022-10-03 PROCEDURE — 86140 C-REACTIVE PROTEIN: CPT | Performed by: INTERNAL MEDICINE

## 2022-10-03 PROCEDURE — 92979 ENDOLUMINL IVUS OCT C EA: CPT | Mod: 26 | Performed by: INTERNAL MEDICINE

## 2022-10-03 PROCEDURE — 93454 CORONARY ARTERY ANGIO S&I: CPT | Performed by: INTERNAL MEDICINE

## 2022-10-03 PROCEDURE — B241ZZ3 ULTRASONOGRAPHY OF MULTIPLE CORONARY ARTERIES, INTRAVASCULAR: ICD-10-PCS | Performed by: INTERNAL MEDICINE

## 2022-10-03 PROCEDURE — 250N000011 HC RX IP 250 OP 636: Performed by: INTERNAL MEDICINE

## 2022-10-03 PROCEDURE — 93005 ELECTROCARDIOGRAM TRACING: CPT

## 2022-10-03 PROCEDURE — 85347 COAGULATION TIME ACTIVATED: CPT

## 2022-10-03 PROCEDURE — 250N000013 HC RX MED GY IP 250 OP 250 PS 637: Performed by: NURSE PRACTITIONER

## 2022-10-03 PROCEDURE — 92978 ENDOLUMINL IVUS OCT C 1ST: CPT | Performed by: INTERNAL MEDICINE

## 2022-10-03 PROCEDURE — 85520 HEPARIN ASSAY: CPT | Performed by: INTERNAL MEDICINE

## 2022-10-03 PROCEDURE — 92928 PRQ TCAT PLMT NTRAC ST 1 LES: CPT | Mod: RI | Performed by: INTERNAL MEDICINE

## 2022-10-03 PROCEDURE — 36415 COLL VENOUS BLD VENIPUNCTURE: CPT | Performed by: INTERNAL MEDICINE

## 2022-10-03 PROCEDURE — 99152 MOD SED SAME PHYS/QHP 5/>YRS: CPT | Performed by: INTERNAL MEDICINE

## 2022-10-03 PROCEDURE — C1725 CATH, TRANSLUMIN NON-LASER: HCPCS | Performed by: INTERNAL MEDICINE

## 2022-10-03 PROCEDURE — C1894 INTRO/SHEATH, NON-LASER: HCPCS | Performed by: INTERNAL MEDICINE

## 2022-10-03 PROCEDURE — 99233 SBSQ HOSP IP/OBS HIGH 50: CPT | Mod: 25 | Performed by: INTERNAL MEDICINE

## 2022-10-03 PROCEDURE — C1769 GUIDE WIRE: HCPCS | Performed by: INTERNAL MEDICINE

## 2022-10-03 PROCEDURE — 85027 COMPLETE CBC AUTOMATED: CPT | Performed by: INTERNAL MEDICINE

## 2022-10-03 PROCEDURE — 250N000013 HC RX MED GY IP 250 OP 250 PS 637: Performed by: STUDENT IN AN ORGANIZED HEALTH CARE EDUCATION/TRAINING PROGRAM

## 2022-10-03 PROCEDURE — C1887 CATHETER, GUIDING: HCPCS | Performed by: INTERNAL MEDICINE

## 2022-10-03 PROCEDURE — 250N000009 HC RX 250: Performed by: INTERNAL MEDICINE

## 2022-10-03 PROCEDURE — 99232 SBSQ HOSP IP/OBS MODERATE 35: CPT | Performed by: INTERNAL MEDICINE

## 2022-10-03 PROCEDURE — 92978 ENDOLUMINL IVUS OCT C 1ST: CPT | Mod: 26 | Performed by: INTERNAL MEDICINE

## 2022-10-03 PROCEDURE — C1874 STENT, COATED/COV W/DEL SYS: HCPCS | Performed by: INTERNAL MEDICINE

## 2022-10-03 PROCEDURE — 99152 MOD SED SAME PHYS/QHP 5/>YRS: CPT | Mod: GC | Performed by: INTERNAL MEDICINE

## 2022-10-03 PROCEDURE — 80048 BASIC METABOLIC PNL TOTAL CA: CPT | Performed by: INTERNAL MEDICINE

## 2022-10-03 PROCEDURE — 93454 CORONARY ARTERY ANGIO S&I: CPT | Mod: 26 | Performed by: INTERNAL MEDICINE

## 2022-10-03 PROCEDURE — 210N000002 HC R&B HEART CARE

## 2022-10-03 PROCEDURE — 93571 IV DOP VEL&/PRESS C FLO 1ST: CPT | Performed by: INTERNAL MEDICINE

## 2022-10-03 PROCEDURE — 83735 ASSAY OF MAGNESIUM: CPT | Performed by: INTERNAL MEDICINE

## 2022-10-03 PROCEDURE — B2111ZZ FLUOROSCOPY OF MULTIPLE CORONARY ARTERIES USING LOW OSMOLAR CONTRAST: ICD-10-PCS | Performed by: INTERNAL MEDICINE

## 2022-10-03 PROCEDURE — 99153 MOD SED SAME PHYS/QHP EA: CPT | Performed by: INTERNAL MEDICINE

## 2022-10-03 PROCEDURE — 272N000001 HC OR GENERAL SUPPLY STERILE: Performed by: INTERNAL MEDICINE

## 2022-10-03 PROCEDURE — C1753 CATH, INTRAVAS ULTRASOUND: HCPCS | Performed by: INTERNAL MEDICINE

## 2022-10-03 PROCEDURE — 36415 COLL VENOUS BLD VENIPUNCTURE: CPT | Performed by: STUDENT IN AN ORGANIZED HEALTH CARE EDUCATION/TRAINING PROGRAM

## 2022-10-03 DEVICE — STENT CORONARY DES SYNERGY XD MR US 2.50X20MM H7493941820250: Type: IMPLANTABLE DEVICE | Status: FUNCTIONAL

## 2022-10-03 DEVICE — STENT CORONARY DES SYNERGY XD MR US 3.50X16MM H7493941816350: Type: IMPLANTABLE DEVICE | Status: FUNCTIONAL

## 2022-10-03 RX ORDER — HYDRALAZINE HYDROCHLORIDE 20 MG/ML
10 INJECTION INTRAMUSCULAR; INTRAVENOUS EVERY 4 HOURS PRN
Status: DISCONTINUED | OUTPATIENT
Start: 2022-10-03 | End: 2022-10-05 | Stop reason: HOSPADM

## 2022-10-03 RX ORDER — NALOXONE HYDROCHLORIDE 0.4 MG/ML
0.2 INJECTION, SOLUTION INTRAMUSCULAR; INTRAVENOUS; SUBCUTANEOUS
Status: DISCONTINUED | OUTPATIENT
Start: 2022-10-03 | End: 2022-10-03

## 2022-10-03 RX ORDER — NALOXONE HYDROCHLORIDE 0.4 MG/ML
0.4 INJECTION, SOLUTION INTRAMUSCULAR; INTRAVENOUS; SUBCUTANEOUS
Status: DISCONTINUED | OUTPATIENT
Start: 2022-10-03 | End: 2022-10-03

## 2022-10-03 RX ORDER — OXYCODONE HYDROCHLORIDE 5 MG/1
5 TABLET ORAL EVERY 4 HOURS PRN
Status: DISCONTINUED | OUTPATIENT
Start: 2022-10-03 | End: 2022-10-05 | Stop reason: HOSPADM

## 2022-10-03 RX ORDER — FLUMAZENIL 0.1 MG/ML
0.2 INJECTION, SOLUTION INTRAVENOUS
Status: ACTIVE | OUTPATIENT
Start: 2022-10-03 | End: 2022-10-04

## 2022-10-03 RX ORDER — ONDANSETRON 2 MG/ML
4 INJECTION INTRAMUSCULAR; INTRAVENOUS EVERY 6 HOURS PRN
Status: DISCONTINUED | OUTPATIENT
Start: 2022-10-03 | End: 2022-10-05 | Stop reason: HOSPADM

## 2022-10-03 RX ORDER — ASPIRIN 81 MG/1
81 TABLET, CHEWABLE ORAL DAILY
Qty: 30 TABLET | Refills: 3 | Status: SHIPPED | OUTPATIENT
Start: 2022-10-04

## 2022-10-03 RX ORDER — VERAPAMIL HYDROCHLORIDE 2.5 MG/ML
INJECTION, SOLUTION INTRAVENOUS
Status: DISCONTINUED | OUTPATIENT
Start: 2022-10-03 | End: 2022-10-03 | Stop reason: HOSPADM

## 2022-10-03 RX ORDER — ASPIRIN 81 MG/1
81 TABLET, CHEWABLE ORAL ONCE
Status: DISCONTINUED | OUTPATIENT
Start: 2022-10-03 | End: 2022-10-03

## 2022-10-03 RX ORDER — ASPIRIN 81 MG/1
81 TABLET ORAL DAILY
Status: DISCONTINUED | OUTPATIENT
Start: 2022-10-04 | End: 2022-10-05 | Stop reason: HOSPADM

## 2022-10-03 RX ORDER — HEPARIN SODIUM 1000 [USP'U]/ML
INJECTION, SOLUTION INTRAVENOUS; SUBCUTANEOUS
Status: DISCONTINUED | OUTPATIENT
Start: 2022-10-03 | End: 2022-10-03 | Stop reason: HOSPADM

## 2022-10-03 RX ORDER — DOBUTAMINE HYDROCHLORIDE 200 MG/100ML
10-50 INJECTION INTRAVENOUS CONTINUOUS
Status: ACTIVE | OUTPATIENT
Start: 2022-10-03 | End: 2022-10-03

## 2022-10-03 RX ORDER — ROSUVASTATIN CALCIUM 40 MG/1
40 TABLET, COATED ORAL DAILY
Qty: 90 TABLET | Refills: 3 | Status: SHIPPED | OUTPATIENT
Start: 2022-10-03 | End: 2022-10-21

## 2022-10-03 RX ORDER — NALOXONE HYDROCHLORIDE 0.4 MG/ML
0.2 INJECTION, SOLUTION INTRAMUSCULAR; INTRAVENOUS; SUBCUTANEOUS
Status: DISCONTINUED | OUTPATIENT
Start: 2022-10-03 | End: 2022-10-05 | Stop reason: HOSPADM

## 2022-10-03 RX ORDER — NITROGLYCERIN 0.4 MG/1
0.4 TABLET SUBLINGUAL EVERY 5 MIN PRN
Status: DISCONTINUED | OUTPATIENT
Start: 2022-10-03 | End: 2022-10-05 | Stop reason: HOSPADM

## 2022-10-03 RX ORDER — ATROPINE SULFATE 0.1 MG/ML
0.5 INJECTION INTRAVENOUS
Status: ACTIVE | OUTPATIENT
Start: 2022-10-03 | End: 2022-10-04

## 2022-10-03 RX ORDER — NITROGLYCERIN 5 MG/ML
VIAL (ML) INTRAVENOUS
Status: DISCONTINUED | OUTPATIENT
Start: 2022-10-03 | End: 2022-10-03 | Stop reason: HOSPADM

## 2022-10-03 RX ORDER — NALOXONE HYDROCHLORIDE 0.4 MG/ML
0.4 INJECTION, SOLUTION INTRAMUSCULAR; INTRAVENOUS; SUBCUTANEOUS
Status: DISCONTINUED | OUTPATIENT
Start: 2022-10-03 | End: 2022-10-05 | Stop reason: HOSPADM

## 2022-10-03 RX ORDER — LORAZEPAM 2 MG/ML
0.5 INJECTION INTRAMUSCULAR
Status: DISCONTINUED | OUTPATIENT
Start: 2022-10-03 | End: 2022-10-03 | Stop reason: HOSPADM

## 2022-10-03 RX ORDER — METOPROLOL TARTRATE 1 MG/ML
1-20 INJECTION, SOLUTION INTRAVENOUS
Status: ACTIVE | OUTPATIENT
Start: 2022-10-03 | End: 2022-10-03

## 2022-10-03 RX ORDER — METOPROLOL TARTRATE 1 MG/ML
5 INJECTION, SOLUTION INTRAVENOUS
Status: DISCONTINUED | OUTPATIENT
Start: 2022-10-03 | End: 2022-10-05 | Stop reason: HOSPADM

## 2022-10-03 RX ORDER — ROSUVASTATIN CALCIUM 20 MG/1
40 TABLET, COATED ORAL DAILY
Status: DISCONTINUED | OUTPATIENT
Start: 2022-10-03 | End: 2022-10-05 | Stop reason: HOSPADM

## 2022-10-03 RX ORDER — ASPIRIN 81 MG/1
243 TABLET, CHEWABLE ORAL ONCE
Status: DISCONTINUED | OUTPATIENT
Start: 2022-10-03 | End: 2022-10-03 | Stop reason: HOSPADM

## 2022-10-03 RX ORDER — OXYCODONE HYDROCHLORIDE 5 MG/1
10 TABLET ORAL EVERY 4 HOURS PRN
Status: DISCONTINUED | OUTPATIENT
Start: 2022-10-03 | End: 2022-10-05 | Stop reason: HOSPADM

## 2022-10-03 RX ORDER — SODIUM CHLORIDE 9 MG/ML
INJECTION, SOLUTION INTRAVENOUS CONTINUOUS
Status: DISCONTINUED | OUTPATIENT
Start: 2022-10-03 | End: 2022-10-03 | Stop reason: HOSPADM

## 2022-10-03 RX ORDER — SODIUM CHLORIDE 9 MG/ML
INJECTION, SOLUTION INTRAVENOUS CONTINUOUS
Status: ACTIVE | OUTPATIENT
Start: 2022-10-03 | End: 2022-10-03

## 2022-10-03 RX ORDER — ATROPINE SULFATE 0.1 MG/ML
.2-2 INJECTION INTRAVENOUS
Status: ACTIVE | OUTPATIENT
Start: 2022-10-03 | End: 2022-10-03

## 2022-10-03 RX ORDER — FENTANYL CITRATE 50 UG/ML
INJECTION, SOLUTION INTRAMUSCULAR; INTRAVENOUS
Status: DISCONTINUED | OUTPATIENT
Start: 2022-10-03 | End: 2022-10-03 | Stop reason: HOSPADM

## 2022-10-03 RX ORDER — POTASSIUM CHLORIDE 1500 MG/1
20 TABLET, EXTENDED RELEASE ORAL
Status: DISCONTINUED | OUTPATIENT
Start: 2022-10-03 | End: 2022-10-03 | Stop reason: HOSPADM

## 2022-10-03 RX ORDER — ACETAMINOPHEN 325 MG/1
650 TABLET ORAL EVERY 4 HOURS PRN
Status: DISCONTINUED | OUTPATIENT
Start: 2022-10-03 | End: 2022-10-03

## 2022-10-03 RX ORDER — LIDOCAINE 40 MG/G
CREAM TOPICAL
Status: DISCONTINUED | OUTPATIENT
Start: 2022-10-03 | End: 2022-10-03

## 2022-10-03 RX ORDER — LORAZEPAM 0.5 MG/1
0.5 TABLET ORAL
Status: DISCONTINUED | OUTPATIENT
Start: 2022-10-03 | End: 2022-10-03 | Stop reason: HOSPADM

## 2022-10-03 RX ORDER — ONDANSETRON 4 MG/1
4 TABLET, ORALLY DISINTEGRATING ORAL EVERY 6 HOURS PRN
Status: DISCONTINUED | OUTPATIENT
Start: 2022-10-03 | End: 2022-10-05 | Stop reason: HOSPADM

## 2022-10-03 RX ORDER — ADENOSINE 3 MG/ML
INJECTION, SOLUTION INTRAVENOUS
Status: DISCONTINUED | OUTPATIENT
Start: 2022-10-03 | End: 2022-10-03 | Stop reason: HOSPADM

## 2022-10-03 RX ORDER — ASPIRIN 325 MG
325 TABLET ORAL ONCE
Status: DISCONTINUED | OUTPATIENT
Start: 2022-10-03 | End: 2022-10-03 | Stop reason: ALTCHOICE

## 2022-10-03 RX ORDER — FENTANYL CITRATE 50 UG/ML
25 INJECTION, SOLUTION INTRAMUSCULAR; INTRAVENOUS
Status: DISCONTINUED | OUTPATIENT
Start: 2022-10-03 | End: 2022-10-05 | Stop reason: HOSPADM

## 2022-10-03 RX ADMIN — VENLAFAXINE HYDROCHLORIDE 150 MG: 150 CAPSULE, EXTENDED RELEASE ORAL at 08:24

## 2022-10-03 RX ADMIN — METOPROLOL TARTRATE 25 MG: 25 TABLET, FILM COATED ORAL at 19:59

## 2022-10-03 RX ADMIN — ASPIRIN 81 MG CHEWABLE TABLET 81 MG: 81 TABLET CHEWABLE at 08:24

## 2022-10-03 RX ADMIN — ROSUVASTATIN CALCIUM 40 MG: 20 TABLET, FILM COATED ORAL at 20:00

## 2022-10-03 RX ADMIN — PANTOPRAZOLE SODIUM 40 MG: 40 TABLET, DELAYED RELEASE ORAL at 08:24

## 2022-10-03 RX ADMIN — METOPROLOL TARTRATE 25 MG: 25 TABLET, FILM COATED ORAL at 08:24

## 2022-10-03 RX ADMIN — TICAGRELOR 90 MG: 90 TABLET ORAL at 22:34

## 2022-10-03 ASSESSMENT — ACTIVITIES OF DAILY LIVING (ADL)
ADLS_ACUITY_SCORE: 35
ADLS_ACUITY_SCORE: 36
ADLS_ACUITY_SCORE: 35
ADLS_ACUITY_SCORE: 36
ADLS_ACUITY_SCORE: 36
ADLS_ACUITY_SCORE: 35
ADLS_ACUITY_SCORE: 35
ADLS_ACUITY_SCORE: 36

## 2022-10-03 NOTE — PROGRESS NOTES
Spoke with Dr. Pinto regarding plan for patient to go for angiogram. Writer took message from family practice doctor and passed on phone number to MD. Nando Morales is aware of situation and plan for patient.     Mikayla Harley RN on 10/3/2022 at 1:00 PM

## 2022-10-03 NOTE — PROVIDER NOTIFICATION
MD Notification    Notified Person: MD    Notified Person Name: Sapna Santiago    Notification Date/Time: 0115 10/3    Notification Interaction: AMCOM    Purpose of Notification: 256-RB  Note says Pt. is to stay on heparin for 48hrs. It has been 48hrs and pt. has a lexiscan tomorrow. Would you like me to turn off the drip or leave it on till morning?   Thanks, Alecia RN *[phone]    Orders Received:    Comments:

## 2022-10-03 NOTE — PROGRESS NOTES
"BP (!) 159/99   Pulse 66   Temp 98.2  F (36.8  C) (Oral)   Resp 18   Ht 1.855 m (6' 1.03\")   Wt 88.2 kg (194 lb 6.4 oz)   SpO2 99%   BMI 25.63 kg/m   .    Assessment: Patient is alert and oriented x4. Right wrist access site from angio. CMS intact. Telemetry sinus rhythm with 1st degree AV block. Voiding with urinal, WNL.     Pain management: denies pain     Interventions this shift: angio completed, right wrist access site     Goals for next shift: monitor vitals, monitor right wrist access site    Mikayla Harley RN on 10/3/2022 at 7:02 PM           "

## 2022-10-03 NOTE — PROGRESS NOTES
[facility]    Hospitalist Progress Note    Interval History   - Lexiscan/Dobutamine stress test changed to angiogram today, discussed with nurse and Cardiology team. Patient will stay the day and possibly discharge tomorrow    Assessment & Plan   Summary: Candido Holliday is a 81 year old male with PMH GERD, depression, anxiety and SARITA who was admitted on 9/30/2022 with epigastric, lower sternal chest pain, weakness and dizziness that has progressed over the past 4-5 days with recent syncopal event.    NSTEMI versus type 2 MI  Syncope  Admit with vague, but progressive symptoms that have been ongoing for the past several weeks and have worsened over the last 4-5 days, including SOB, dizziness, weakness, loss of appetite, and patient reported syncopal episode 4 days ago accompanied by dizziness and diaphoresis.   No personal or past family history of HTN, HLD or heart disease. Never a smoker and rare alcohol use. ECG with SR 1AVB and RBBB without obvious signs of ischemia.Troponin elevated at 4627-->5430-->4479. He was initiated on ASA and heparin infusion in the ED. He was admitted for Cardiology consult and further work-up. Echocardiogram Ef65-70% reportedly no WMA, final results pending.   Differential includes ACS, myocarditis, pericarditis, type 2 MI.  - Appreciate Cardiology consult   - Heparin drip   - Angiogram today  - Lipid panel shows low HDL  - ASA  - Continue metoprolol started this admission  - Continue cardiac monitoring    COVID-19  Elevated LFTs, improved  Patient's vague presenting complaints could reflect COVID-19 infection. COVID-19 PCR positive on admission 9/30/2022. Patient reports up to two weeks of symptoms prior to admission--along with slightly elevated LFTs no clear role for remdesivir here. CT chest no lung infiltrates. CRP has been low and trending down, likelypatient is recovered from COVID.  - Quarantine 10 days per protocol until  10/09/2022    Depression  Anxiety  During exam, generalized shaking and endorsing significant anxiety  - PTA regimen: Effexor 150 mg daily  - Continue Xanax BID PRN    SARITA  -CPAP at night    DVT Prophylaxis: Heparin drip  Code Status: Full Code  PT/OT: not needed  Diet: NPO for Medical/Clinical Reasons Except for: Meds  NPO for Medical/Clinical Reasons Except for: Meds      Disposition: Expected discharge tomorrow to home    Higinio Camargo MD, MD  Text Page  (7am to 6pm)  -Data reviewed today: I reviewed all new labs and imaging results over the last 24 hours.    Physical Exam   Temp: 98.2  F (36.8  C) Temp src: Oral BP: (!) 146/86 Pulse: 67   Resp: 18 SpO2: 97 % O2 Device: None (Room air)    Vitals:    09/30/22 1329 10/01/22 0016 10/02/22 0632   Weight: 95.3 kg (210 lb) 89.9 kg (198 lb 3.2 oz) 88.2 kg (194 lb 6.4 oz)     Vital Signs with Ranges  Temp:  [98.2  F (36.8  C)] 98.2  F (36.8  C)  Pulse:  [67-71] 67  Resp:  [16-18] 18  BP: (146-154)/(86-88) 146/86  SpO2:  [95 %-97 %] 97 %  I/O last 3 completed shifts:  In: 540 [P.O.:540]  Out: 150 [Urine:150]  O2 requirements: none    Constitutional: Male in NAD  HEENT: Eyes nonicteric, oral mucosa moist  Cardiovascular: RRR, normal S1/2, no m/r/g  Respiratory: CTAB, no wheezing or crackles  Vascular: No LE pitting edema  GI: Normoactive bowel sounds, nontender, nondistended  Skin/Integumen: No rashes  Neuro/Psych: Appropriate affect and mood. A&Ox3, moves all extremities    Medications     heparin 950 Units/hr (10/03/22 0830)     - MEDICATION INSTRUCTIONS -       - MEDICATION INSTRUCTIONS -       - MEDICATION INSTRUCTIONS -       ACE/ARB/ARNI NOT PRESCRIBED       STATIN NOT PRESCRIBED       sodium chloride         aspirin  243 mg Oral Once     aspirin  81 mg Oral Daily     metoprolol tartrate  25 mg Oral BID     pantoprazole  40 mg Oral QAM AC     sodium chloride (PF)  10 mL Intravenous Once     sodium chloride (PF)  3 mL Intracatheter Q8H     venlafaxine  150 mg  Oral Daily       Data   Recent Labs   Lab 10/03/22  0638 10/02/22  0644 10/01/22  0628 09/30/22  2239 09/30/22  1335   WBC  --  4.7 4.7 5.1 4.2   HGB  --  15.7 15.0 14.3 14.9   MCV  --  93 91 91 94   PLT  --  174 148* 145* 144*   INR  --   --   --   --  1.01    138 139  --  136   POTASSIUM 4.3 4.1 4.2  --  4.3   CHLORIDE 106 105 105  --  102   CO2 27 28 29  --  29   BUN 18 17 17  --  19   CR 1.04 0.93 0.96  --  1.07   ANIONGAP 6 5 5  --  5   BHARTI 8.9 8.7 8.6  --  8.5   * 101* 98  --  97   ALBUMIN  --  3.1* 3.1*  --  3.0*   PROTTOTAL  --  6.7* 6.6*  --  7.1   BILITOTAL  --  1.1 1.3  --  0.9   ALKPHOS  --  47 46  --  53   ALT  --  66 69  --  79*   AST  --  49* 51*  --  67*   LIPASE  --   --   --   --  133       Imaging:   No results found for this or any previous visit (from the past 24 hour(s)).

## 2022-10-03 NOTE — PROGRESS NOTES
[facility]    Cardiology Progress Note     Assessment & Plan   Candido Holliday is a 81 year old male who was admitted on 9/30/2022.    1.  COVID-19 pneumonia  2.  NSTEMI   3.  Atypical chest pain  4.  Essential hypertension  5.  LDL 60 mg/dL  6.  GERD    Recommendation:-   1.  Unable to perform Lexiscan due to patient's inability to raise the arm above his shoulder.  Given moderate calcification in the coronaries noted on CT scan and significantly elevated troponin we decided to pursue a CT coronary angiogram.  The procedure was explained, risks and benefits discussed and consent obtained.  Patient's son who apparently is a physician at [facility] was also updated with that decision and all his questions were also answered.  2.  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  Mid LAD which was hemodynamically not flow-limiting.  The proximal circumflex which was the culprit culprit lesion and lesion in OM.  If LAD lesion was hemodynamically significant we would have stopped and considered three-vessel bypass.  Fortunately, since it was not decided to perform PCI on the circumflex.  Patient's son was again updated.  3.  Blood pressure remains elevated.  Will uptitrate meds.    Ar Pinto MD  Text Page (7am - 5pm, M-F)    Interval History   Bp slightly elevated    Physical Exam   Temp: 98.2  F (36.8  C) Temp src: Oral BP: (!) 146/86 Pulse: 67   Resp: 18 SpO2: 97 % O2 Device: Nasal cannula Oxygen Delivery: 2 LPM  Vitals:    09/30/22 1329 10/01/22 0016 10/02/22 0632   Weight: 95.3 kg (210 lb) 89.9 kg (198 lb 3.2 oz) 88.2 kg (194 lb 6.4 oz)     Vital Signs with Ranges  Temp:  [98.2  F (36.8  C)] 98.2  F (36.8  C)  Pulse:  [67-71] 67  Resp:  [16-18] 18  BP: (146-154)/(86-88) 146/86  SpO2:  [95 %-97 %] 97 %  I/O last 3 completed shifts:  In: 540 [P.O.:540]  Out: 150 [Urine:150]  Patient Active Problem List   Diagnosis     Cervical dystonia     NSTEMI (non-ST elevated myocardial infarction) (H)      Shortness of breath     Weakness     Syncope     Depression     Anxiety       Constitutional:  HEENT: Alert, no apparent distress  Normocephalic, atraumatic   Lungs: Normal bilateral breath sounds   Cardiovascular: Regular rate and rhythm, normal S1 and S2, and no murmur   GI:   Lymph node  Neck No jaundice, no ascitis, no palpable hepatospleenomeagaly  No enlargement  No jugular vein extension or carotid bruit   Skin: Normal   Extremity:  Neurological:  Psych: No edema  AAOX3  Alert and oriented x3       Medications     heparin Stopped (10/03/22 1328)     - MEDICATION INSTRUCTIONS -       - MEDICATION INSTRUCTIONS -       - MEDICATION INSTRUCTIONS -       ACE/ARB/ARNI NOT PRESCRIBED       STATIN NOT PRESCRIBED       sodium chloride         aspirin  243 mg Oral Once     aspirin  81 mg Oral Daily     metoprolol tartrate  25 mg Oral BID     pantoprazole  40 mg Oral QAM AC     sodium chloride (PF)  10 mL Intravenous Once     sodium chloride (PF)  3 mL Intracatheter Q8H     venlafaxine  150 mg Oral Daily       Data   Results for orders placed or performed during the hospital encounter of 09/30/22 (from the past 24 hour(s))   CRP inflammation   Result Value Ref Range    CRP Inflammation 3.9 0.0 - 8.0 mg/L   Basic metabolic panel   Result Value Ref Range    Sodium 139 133 - 144 mmol/L    Potassium 4.3 3.4 - 5.3 mmol/L    Chloride 106 94 - 109 mmol/L    Carbon Dioxide (CO2) 27 20 - 32 mmol/L    Anion Gap 6 3 - 14 mmol/L    Urea Nitrogen 18 7 - 30 mg/dL    Creatinine 1.04 0.66 - 1.25 mg/dL    Calcium 8.9 8.5 - 10.1 mg/dL    Glucose 111 (H) 70 - 99 mg/dL    GFR Estimate 72 >60 mL/min/1.73m2   Magnesium   Result Value Ref Range    Magnesium 2.4 (H) 1.6 - 2.3 mg/dL   EKG 12-lead, tracing only - Hospital locations:  [facility]   Result Value Ref Range    Systolic Blood Pressure  mmHg    Diastolic Blood Pressure  mmHg    Ventricular Rate 61 BPM    Atrial Rate 61 BPM    ME Interval 304 ms    QRS Duration 132 ms     ms     QTc 424 ms    P Axis 3 degrees    R AXIS 75 degrees    T Axis 65 degrees    Interpretation ECG       Sinus rhythm with 1st degree A-V block  Right bundle branch block  Abnormal ECG  When compared with ECG of 30-SEP-2022 13:25,  Nonspecific T wave abnormality, worse in Lateral leads     CBC with platelets   Result Value Ref Range    WBC Count 5.1 4.0 - 11.0 10e3/uL    RBC Count 5.14 4.40 - 5.90 10e6/uL    Hemoglobin 15.9 13.3 - 17.7 g/dL    Hematocrit 48.1 40.0 - 53.0 %    MCV 94 78 - 100 fL    MCH 30.9 26.5 - 33.0 pg    MCHC 33.1 31.5 - 36.5 g/dL    RDW 13.2 10.0 - 15.0 %    Platelet Count 204 150 - 450 10e3/uL   Heparin Unfractionated Anti Xa Level   Result Value Ref Range    Anti Xa Unfractionated Heparin 0.26 For Reference Range, See Comment IU/mL    Narrative    Therapeutic Range: UFH: 0.25-0.50 IU/mL for low intensity dosing,  0.30-0.70 IU/mL for high intensity dosing DVT and PE.  This test is not validated for other direct factor X inhibitors (e.g. rivaroxaban, apixaban, edoxaban, betrixaban, fondaparinux) and should not be used for monitoring of other medications.   Activated clotting time celite, POCT   Result Value Ref Range    Activated Clotting Time (Celite) POCT 271 (H) 74 - 150 seconds   Activated clotting time celite, POCT   Result Value Ref Range    Activated Clotting Time (Celite) POCT 318 (H) 74 - 150 seconds

## 2022-10-04 ENCOUNTER — APPOINTMENT (OUTPATIENT)
Dept: PHYSICAL THERAPY | Facility: CLINIC | Age: 82
DRG: 246 | End: 2022-10-04
Attending: NURSE PRACTITIONER
Payer: MEDICARE

## 2022-10-04 LAB
ANION GAP SERPL CALCULATED.3IONS-SCNC: 4 MMOL/L (ref 3–14)
BUN SERPL-MCNC: 18 MG/DL (ref 7–30)
CALCIUM SERPL-MCNC: 8.7 MG/DL (ref 8.5–10.1)
CHLORIDE BLD-SCNC: 105 MMOL/L (ref 94–109)
CO2 SERPL-SCNC: 28 MMOL/L (ref 20–32)
CREAT SERPL-MCNC: 0.93 MG/DL (ref 0.66–1.25)
GFR SERPL CREATININE-BSD FRML MDRD: 82 ML/MIN/1.73M2
GLUCOSE BLD-MCNC: 97 MG/DL (ref 70–99)
MAGNESIUM SERPL-MCNC: 2.4 MG/DL (ref 1.6–2.3)
POTASSIUM BLD-SCNC: 3.9 MMOL/L (ref 3.4–5.3)
SODIUM SERPL-SCNC: 137 MMOL/L (ref 133–144)

## 2022-10-04 PROCEDURE — 80048 BASIC METABOLIC PNL TOTAL CA: CPT | Performed by: STUDENT IN AN ORGANIZED HEALTH CARE EDUCATION/TRAINING PROGRAM

## 2022-10-04 PROCEDURE — 250N000013 HC RX MED GY IP 250 OP 250 PS 637: Performed by: NURSE PRACTITIONER

## 2022-10-04 PROCEDURE — 250N000013 HC RX MED GY IP 250 OP 250 PS 637: Performed by: STUDENT IN AN ORGANIZED HEALTH CARE EDUCATION/TRAINING PROGRAM

## 2022-10-04 PROCEDURE — 97161 PT EVAL LOW COMPLEX 20 MIN: CPT | Mod: GP

## 2022-10-04 PROCEDURE — 97530 THERAPEUTIC ACTIVITIES: CPT | Mod: GP

## 2022-10-04 PROCEDURE — 99233 SBSQ HOSP IP/OBS HIGH 50: CPT | Performed by: INTERNAL MEDICINE

## 2022-10-04 PROCEDURE — 250N000013 HC RX MED GY IP 250 OP 250 PS 637: Performed by: INTERNAL MEDICINE

## 2022-10-04 PROCEDURE — 99232 SBSQ HOSP IP/OBS MODERATE 35: CPT | Performed by: INTERNAL MEDICINE

## 2022-10-04 PROCEDURE — 93010 ELECTROCARDIOGRAM REPORT: CPT | Performed by: INTERNAL MEDICINE

## 2022-10-04 PROCEDURE — 97110 THERAPEUTIC EXERCISES: CPT | Mod: GP

## 2022-10-04 PROCEDURE — 36415 COLL VENOUS BLD VENIPUNCTURE: CPT | Performed by: STUDENT IN AN ORGANIZED HEALTH CARE EDUCATION/TRAINING PROGRAM

## 2022-10-04 PROCEDURE — 210N000002 HC R&B HEART CARE

## 2022-10-04 PROCEDURE — 83735 ASSAY OF MAGNESIUM: CPT | Performed by: INTERNAL MEDICINE

## 2022-10-04 PROCEDURE — 93005 ELECTROCARDIOGRAM TRACING: CPT

## 2022-10-04 RX ORDER — METOPROLOL TARTRATE 50 MG
50 TABLET ORAL 2 TIMES DAILY
Status: DISCONTINUED | OUTPATIENT
Start: 2022-10-04 | End: 2022-10-05 | Stop reason: HOSPADM

## 2022-10-04 RX ORDER — NITROGLYCERIN 0.4 MG/1
TABLET SUBLINGUAL
Qty: 30 TABLET | Refills: 1 | Status: SHIPPED | OUTPATIENT
Start: 2022-10-04

## 2022-10-04 RX ORDER — METOPROLOL TARTRATE 50 MG
50 TABLET ORAL 2 TIMES DAILY
Qty: 60 TABLET | Refills: 1 | Status: SHIPPED | OUTPATIENT
Start: 2022-10-04 | End: 2022-10-21

## 2022-10-04 RX ADMIN — VENLAFAXINE HYDROCHLORIDE 150 MG: 150 CAPSULE, EXTENDED RELEASE ORAL at 09:12

## 2022-10-04 RX ADMIN — ASPIRIN 81 MG: 81 TABLET, COATED ORAL at 09:12

## 2022-10-04 RX ADMIN — METOPROLOL TARTRATE 50 MG: 50 TABLET, FILM COATED ORAL at 20:19

## 2022-10-04 RX ADMIN — PANTOPRAZOLE SODIUM 40 MG: 40 TABLET, DELAYED RELEASE ORAL at 09:12

## 2022-10-04 RX ADMIN — ROSUVASTATIN CALCIUM 40 MG: 20 TABLET, FILM COATED ORAL at 09:13

## 2022-10-04 RX ADMIN — TICAGRELOR 90 MG: 90 TABLET ORAL at 22:22

## 2022-10-04 RX ADMIN — TICAGRELOR 90 MG: 90 TABLET ORAL at 10:52

## 2022-10-04 RX ADMIN — METOPROLOL TARTRATE 25 MG: 25 TABLET, FILM COATED ORAL at 09:12

## 2022-10-04 ASSESSMENT — ACTIVITIES OF DAILY LIVING (ADL)
ADLS_ACUITY_SCORE: 19
ADLS_ACUITY_SCORE: 19
ADLS_ACUITY_SCORE: 36
ADLS_ACUITY_SCORE: 19
ADLS_ACUITY_SCORE: 36
ADLS_ACUITY_SCORE: 36
ADLS_ACUITY_SCORE: 19

## 2022-10-04 NOTE — PROGRESS NOTES
.[facility]    Cardiology Progress Note     Assessment & Plan   Candido Holliday is a 81 year old male who was admitted on 9/30/2022.    1.  COVID-19 pneumonia  2.  NSTEMI   3.  Atypical chest pain  4.  Essential hypertension  5.  LDL 60 mg/dL  6.  GERD    Recommendation:-   1.  Noted to have three-vessel disease and underwent PCI of circumflex and ramus intermedius.  FFR of proximal LAD was negative.  In the future, if if he has chest pain will consider cardiac stress MRI or nuclear stress to rule out ischemia in the anterior wall.    2.  Blood pressure remains elevated.  Will uptitrate meds.    No further recommendations regarding standpoint.  We will sign off.  We will have patient see us in clinic for close follow-up.    Ar Pinto MD  Text Page (7am - 5pm, M-F)    Interval History   Bp slightly elevated    Physical Exam   Temp: 98.8  F (37.1  C) Temp src: Oral BP: (!) 153/90 Pulse: 73   Resp: 18 SpO2: 96 % O2 Device: None (Room air) Oxygen Delivery: 2 LPM  Vitals:    10/01/22 0016 10/02/22 0632 10/04/22 0630   Weight: 89.9 kg (198 lb 3.2 oz) 88.2 kg (194 lb 6.4 oz) 86.6 kg (191 lb)     Vital Signs with Ranges  Temp:  [97.9  F (36.6  C)-98.8  F (37.1  C)] 98.8  F (37.1  C)  Pulse:  [60-95] 73  Resp:  [12-20] 18  BP: (123-159)/(71-99) 153/90  SpO2:  [93 %-99 %] 96 %  I/O last 3 completed shifts:  In: 50 [P.O.:50]  Out: 950 [Urine:950]  Patient Active Problem List   Diagnosis     Cervical dystonia     NSTEMI (non-ST elevated myocardial infarction) (H)     Shortness of breath     Weakness     Syncope     Depression     Anxiety       Constitutional:  HEENT: Alert, no apparent distress  Normocephalic, atraumatic   Lungs: Normal bilateral breath sounds   Cardiovascular: Regular rate and rhythm, normal S1 and S2, and no murmur   GI:   Lymph node  Neck No jaundice, no ascitis, no palpable hepatospleenomeagaly  No enlargement  No jugular vein extension or carotid bruit   Skin: Normal    Extremity:  Neurological:  Psych: No edema  AAOX3  Alert and oriented x3       Medications     - MEDICATION INSTRUCTIONS -       - MEDICATION INSTRUCTIONS -       - MEDICATION INSTRUCTIONS -       Percutaneous Coronary Intervention orders placed (this is information for BPA alerting)       ACE/ARB/ARNI NOT PRESCRIBED       STATIN NOT PRESCRIBED         aspirin  81 mg Oral Daily     metoprolol tartrate  25 mg Oral BID     pantoprazole  40 mg Oral QAM AC     rosuvastatin  40 mg Oral Daily     ticagrelor  90 mg Oral Q12H     venlafaxine  150 mg Oral Daily       Data   Results for orders placed or performed during the hospital encounter of 09/30/22 (from the past 24 hour(s))   EKG 12-lead, tracing only - Hospital locations:  [facility] [place]   Result Value Ref Range    Systolic Blood Pressure  mmHg    Diastolic Blood Pressure  mmHg    Ventricular Rate 61 BPM    Atrial Rate 61 BPM    HI Interval 304 ms    QRS Duration 132 ms     ms    QTc 424 ms    P Axis 3 degrees    R AXIS 75 degrees    T Axis 65 degrees    Interpretation ECG       Sinus rhythm with 1st degree A-V block  Right bundle branch block  Abnormal ECG  When compared with ECG of 30-SEP-2022 13:25,  Nonspecific T wave abnormality, worse in Lateral leads     CBC with platelets   Result Value Ref Range    WBC Count 5.1 4.0 - 11.0 10e3/uL    RBC Count 5.14 4.40 - 5.90 10e6/uL    Hemoglobin 15.9 13.3 - 17.7 g/dL    Hematocrit 48.1 40.0 - 53.0 %    MCV 94 78 - 100 fL    MCH 30.9 26.5 - 33.0 pg    MCHC 33.1 31.5 - 36.5 g/dL    RDW 13.2 10.0 - 15.0 %    Platelet Count 204 150 - 450 10e3/uL   Heparin Unfractionated Anti Xa Level   Result Value Ref Range    Anti Xa Unfractionated Heparin 0.26 For Reference Range, See Comment IU/mL    Narrative    Therapeutic Range: UFH: 0.25-0.50 IU/mL for low intensity dosing,  0.30-0.70 IU/mL for high intensity dosing DVT and PE.  This test is not validated for other direct factor X inhibitors (e.g. rivaroxaban, apixaban,  "edoxaban, betrixaban, fondaparinux) and should not be used for monitoring of other medications.   Activated clotting time celite, POCT   Result Value Ref Range    Activated Clotting Time (Celite) POCT 271 (H) 74 - 150 seconds   Activated clotting time celite, POCT   Result Value Ref Range    Activated Clotting Time (Celite) POCT 318 (H) 74 - 150 seconds   Activated clotting time celite, POCT   Result Value Ref Range    Activated Clotting Time (Celite) POCT 301 (H) 74 - 150 seconds   Cardiac Catheterization    Narrative      Ramus lesion is 90% stenosed.    Prox LAD to Mid LAD lesion is 60% stenosed.    Pressure wire/FFR was performed on the lesion.    Prox Cx lesion is 95% stenosed.     1.  Two vessel obstructive CAD, with culprit a 95% lesion of the proximal   LCx which appears acute.  There is also a severe lesion proximally within   a moderate-sized ramus.    2.  The moderate mid-LAD lesion is not flow-limiting by iFR (0.93) or FFR   (0.84).  Initial measurements suggested flow limitation, but these were   likely due to \"drift,\" and measurements were no longer significant after   repeat normalization.  3.  Successful HD-IVUS guided PCI of the proximal LCx with a single 3.5 x   15 mm Synergy JAMIN.  4.  Successful HD-IVUS guided PCI of the proximal ramus with a single 2.5   x 20 mm Synergy JAMIN, post-dilated up to 3.0 mm proximally.     EKG 12-lead, tracing only   Result Value Ref Range    Systolic Blood Pressure  mmHg    Diastolic Blood Pressure  mmHg    Ventricular Rate 65 BPM    Atrial Rate 65 BPM    NY Interval 334 ms    QRS Duration 142 ms     ms    QTc 447 ms    P Axis 27 degrees    R AXIS 61 degrees    T Axis 61 degrees    Interpretation ECG       Sinus rhythm with 1st degree A-V block  Right bundle branch block  Abnormal ECG  When compared with ECG of 03-OCT-2022 12:21, (unconfirmed)  No significant change was found     Lipid Profile   Result Value Ref Range    Cholesterol 132 <200 mg/dL    " Triglycerides 131 <150 mg/dL    Direct Measure HDL 40 >=40 mg/dL    LDL Cholesterol Calculated 66 <=100 mg/dL    Non HDL Cholesterol 92 <130 mg/dL    Narrative    Cholesterol  Desirable:  <200 mg/dL    Triglycerides  Normal:  Less than 150 mg/dL  Borderline High:  150-199 mg/dL  High:  200-499 mg/dL  Very High:  Greater than or equal to 500 mg/dL    Direct Measure HDL  Female:  Greater than or equal to 50 mg/dL   Male:  Greater than or equal to 40 mg/dL    LDL Cholesterol  Desirable:  <100mg/dL  Above Desirable:  100-129 mg/dL   Borderline High:  130-159 mg/dL   High:  160-189 mg/dL   Very High:  >= 190 mg/dL    Non HDL Cholesterol  Desirable:  130 mg/dL  Above Desirable:  130-159 mg/dL  Borderline High:  160-189 mg/dL  High:  190-219 mg/dL  Very High:  Greater than or equal to 220 mg/dL   Basic metabolic panel   Result Value Ref Range    Sodium 137 133 - 144 mmol/L    Potassium 3.9 3.4 - 5.3 mmol/L    Chloride 105 94 - 109 mmol/L    Carbon Dioxide (CO2) 28 20 - 32 mmol/L    Anion Gap 4 3 - 14 mmol/L    Urea Nitrogen 18 7 - 30 mg/dL    Creatinine 0.93 0.66 - 1.25 mg/dL    Calcium 8.7 8.5 - 10.1 mg/dL    Glucose 97 70 - 99 mg/dL    GFR Estimate 82 >60 mL/min/1.73m2   Magnesium   Result Value Ref Range    Magnesium 2.4 (H) 1.6 - 2.3 mg/dL   EKG 12-lead, tracing only   Result Value Ref Range    Systolic Blood Pressure  mmHg    Diastolic Blood Pressure  mmHg    Ventricular Rate 75 BPM    Atrial Rate 75 BPM    MD Interval 312 ms    QRS Duration 132 ms     ms    QTc 457 ms    P Axis 48 degrees    R AXIS 21 degrees    T Axis 52 degrees    Interpretation ECG       Sinus rhythm with 1st degree A-V block  Right bundle branch block  Abnormal ECG  When compared with ECG of 03-OCT-2022 18:17, (unconfirmed)  No significant change was found

## 2022-10-04 NOTE — PROGRESS NOTES
10/04/22 1100   Quick Adds   Type of Visit Initial PT Evaluation   Living Environment   People in Home spouse   Current Living Arrangements house   Home Accessibility no concerns   Transportation Anticipated car, drives self;family or friend will provide   Self-Care   Usual Activity Tolerance good   Current Activity Tolerance good   Regular Exercise Yes   Activity/Exercise Type walking   Exercise Amount/Frequency 3-5 times/wk   Equipment Currently Used at Home none   Fall history within last six months no   General Information   Onset of Illness/Injury or Date of Surgery 09/30/22   Referring Physician Dr. Camargo   Patient/Family Therapy Goals Statement (PT) To go home   Pertinent History of Current Problem (include personal factors and/or comorbidities that impact the POC) Pt is an 81 year old male admitted with COVID and s/p PCI   Cognition   Affect/Mental Status (Cognition) WFL   Orientation Status (Cognition) oriented x 4   Range of Motion (ROM)   Range of Motion ROM is WFL   Strength (Manual Muscle Testing)   Strength (Manual Muscle Testing) strength is WFL   Bed Mobility   Bed Mobility no deficits identified   Transfers   Transfers no deficits identified   Gait/Stairs (Locomotion)   [place] (Gait) independent   Balance   Balance Comments Good   Clinical Impression   Criteria for Skilled Therapeutic Intervention Yes, treatment indicated   PT Diagnosis (PT) Impaired endurance   Influenced by the following impairments Decreased endurance   Functional limitations due to impairments Difficulty with long distance ambulation   Clinical Presentation (PT Evaluation Complexity) Stable/Uncomplicated   Clinical Presentation Rationale VSS, pain controlled   Clinical Decision Making (Complexity) low complexity   Planned Therapy Interventions (PT) patient/family education;progressive activity/exercise;risk factor education   Risk & Benefits of therapy have been explained evaluation/treatment results reviewed;care  plan/treatment goals reviewed;risks/benefits reviewed;current/potential barriers reviewed;participants voiced agreement with care plan;participants included;patient   PT Discharge Planning   PT Discharge Recommendation (DC Rec) home with outpatient cardiac rehab   PT Rationale for DC Rec Pt is independent with mobility and safe to discharge home. Pt will benefit from outpatient CR to further increase activity tolerance and for cardiac education.   Total Evaluation Time   Total Evaluation Time (Minutes) 10   Physical Therapy Goals   PT Frequency Daily   PT Predicted Duration/Target Date for Goal Attainment 10/06/22   PT Goals Cardiac Phase 1   PT: Understanding of cardiac education to maximize quality of life, condition management, and health outcomes Patient;Verbalize   PT: Perform aerobic activity with stable cardiovascular response continuous;10 minutes;ambulation   PT: Functional/aerobic ambulation tolerance with stable cardiovascular response in order to return to home and community environment Independent;Greater than 300 feet

## 2022-10-04 NOTE — CONSULTS
"NUTRITION EDUCATION    REASON FOR ASSESSMENT:  Nutrition education on American Heart Association (AHA) Heart Healthy Diet.    NUTRITION HISTORY:  Information obtained from patient over the phone (unable to visit in person per direction of department guidelines in the setting of COVID19)   Patient reports following a healthy diet at home   He cooks for self at home with spouse, \"never\" salts his food, consumes red meat only 1-2x per month and rarely consumes packaged and processed snacks   No known food allergies       CURRENT DIET ORDER:  Low saturated fat, <2400 mg Na   Patient reports a decreased appetite as of late 2/2 COVID19 illness     NUTRITION DIAGNOSIS:  Food- and nutrition-related knowledge deficit R/t no prior formula diet teaching received as evidenced by patient report     INTERVENTIONS:  Nutrition Prescription:    Recommended AHA Heart Healthy Diet    Implementation:     Nutrition Education (Content):  a) reviewed Heart Healthy Diet guidelines  b) provided heart healthy diet handout in discharge instructions     Nutrition Education (Application):  a) Discussed current eating habits and recommended alternative food choices    Anticipate good compliance    Diet Education - refer to Education flowsheet    Goals:    Patient verbalizes understanding of diet     All of the above goals met during education session    Follow Up/Monitoring:    Provided RD contact information for future questions      Marcy Vargas RD, LD  Clinical Dietitian   [facility], Ortho, Ortho spine  Pager: [phone]      "

## 2022-10-04 NOTE — PROGRESS NOTES
[facility]  Hospitalist Progress Note  Ervin Valdes MD  10/04/2022    Assessment & Plan   Candido Holliday was admitted on 9/30/2022 by Ervin Valdes MD and I would refer you to their history and physical.  The following problems were addressed during his hospitalization:     Candido Holliday is a 81 year old male with PMH GERD, depression, anxiety and SARITA who was admitted on 9/30/2022 with epigastric, lower sternal chest pain, weakness and dizziness that has progressed over the past 4-5 days with recent syncopal event.     NSTEMI with 2 vessel CAD s/p stenting of pCx and Gauri  Syncope  Admit with vague, but progressive symptoms that have been ongoing for the past several weeks and have worsened over the last 4-5 days, including SOB, dizziness, weakness, loss of appetite, and patient reported syncopal episode 4 days ago accompanied by dizziness and diaphoresis.                No personal or past family history of HTN, HLD or heart disease. Never a smoker and rare alcohol use. ECG with SR 1AVB and RBBB without obvious signs of ischemia.Troponin elevated at 4627-->5430-->4479. He was initiated on ASA and heparin infusion in the ED. He was admitted for Cardiology consult and further work-up. Echocardiogram Ef65-70% reportedly no WMA, final results pending.  - Appreciate Cardiology consult  - Angiogram showed 2 vessel disease that was stented (pCx + Gauri)  - if patient has further chest pain, than stress MRI to look for anterior wall ischemia.  - Lipid panel shows low HDL, started on crestor  - ASA + brilinta  - Continue metoprolol and discontinue atenolol  - discharge with NTG     COVID-19  Elevated LFTs, improved  Patient's vague presenting complaints could reflect COVID-19 infection. COVID-19 PCR positive on admission 9/30/2022. Patient reports up to two weeks of symptoms prior to admission--along with slightly elevated LFTs no clear role for remdesivir here. CT chest no lung  "infiltrates. CRP has been low and trending down, likelypatient is recovered from COVID.  - Quarantine 10 days per protocol until 10/09/2022     Depression  Anxiety  During exam, generalized shaking and endorsing significant anxiety  - PTA regimen: Effexor 150 mg daily  - Continue Xanax BID PRN     SARITA  -CPAP at night    DVT Prophylaxis: PCD    Code Status: FULL    Disposition:   -- home on 10/5    Interval History   -- chart reviwed  -- discussed with RN  -- no chest pain or sob    -Data reviewed today: I reviewed all new labs and imaging over the last 24 hours. I personally reviewed no images or EKG's today.    Physical Exam    , Blood pressure 118/78, pulse 75, temperature 97.8  F (36.6  C), temperature source Oral, resp. rate 18, height 1.855 m (6' 1.03\"), weight 86.6 kg (191 lb), SpO2 95 %.  Vitals:    10/01/22 0016 10/02/22 0632 10/04/22 0630   Weight: 89.9 kg (198 lb 3.2 oz) 88.2 kg (194 lb 6.4 oz) 86.6 kg (191 lb)     Vital Signs with Ranges  Temp:  [97.8  F (36.6  C)-98.8  F (37.1  C)] 97.8  F (36.6  C)  Pulse:  [63-95] 75  Resp:  [12-20] 18  BP: (118-159)/(77-99) 118/78  SpO2:  [93 %-99 %] 95 %  I/O's Last 24 hours  I/O last 3 completed shifts:  In: 50 [P.O.:50]  Out: 650 [Urine:650]    Constitutional: Awake, alert, cooperative, no apparent distress  Respiratory: Clear to auscultation bilaterally, no crackles or wheezing  Cardiovascular: Regular rate and rhythm, normal S1 and S2, and no murmur noted  GI: Normal bowel sounds, soft, non-distended, non-tender  Skin/Integumen: No rashes, no cyanosis, no edema  Other:      Medications   All medications were reviewed.    - MEDICATION INSTRUCTIONS -       - MEDICATION INSTRUCTIONS -       - MEDICATION INSTRUCTIONS -       Percutaneous Coronary Intervention orders placed (this is information for BPA alerting)       ACE/ARB/ARNI NOT PRESCRIBED       STATIN NOT PRESCRIBED         aspirin  81 mg Oral Daily     metoprolol tartrate  50 mg Oral BID     pantoprazole  40 " mg Oral QAM AC     rosuvastatin  40 mg Oral Daily     ticagrelor  90 mg Oral Q12H     venlafaxine  150 mg Oral Daily        Data   Recent Labs   Lab 10/04/22  0619 10/03/22  1244 10/03/22  0638 10/02/22  0644 10/01/22  0628 09/30/22  2239 09/30/22  1335   WBC  --  5.1  --  4.7 4.7   < > 4.2   HGB  --  15.9  --  15.7 15.0   < > 14.9   MCV  --  94  --  93 91   < > 94   PLT  --  204  --  174 148*   < > 144*   INR  --   --   --   --   --   --  1.01     --  139 138 139  --  136   POTASSIUM 3.9  --  4.3 4.1 4.2  --  4.3   CHLORIDE 105  --  106 105 105  --  102   CO2 28  --  27 28 29  --  29   BUN 18  --  18 17 17  --  19   CR 0.93  --  1.04 0.93 0.96  --  1.07   ANIONGAP 4  --  6 5 5  --  5   BHARTI 8.7  --  8.9 8.7 8.6  --  8.5   GLC 97  --  111* 101* 98  --  97   ALBUMIN  --   --   --  3.1* 3.1*  --  3.0*   PROTTOTAL  --   --   --  6.7* 6.6*  --  7.1   BILITOTAL  --   --   --  1.1 1.3  --  0.9   ALKPHOS  --   --   --  47 46  --  53   ALT  --   --   --  66 69  --  79*   AST  --   --   --  49* 51*  --  67*   LIPASE  --   --   --   --   --   --  133    < > = values in this interval not displayed.       No results found for this or any previous visit (from the past 24 hour(s)).    Ervin Valdes MD  Text Page  (7am to 6pm)

## 2022-10-04 NOTE — PROGRESS NOTES
Patient is A/O x 4, vss, a-febrile, denies chest pain, patient is up to the bathroom independently, patient had angiogram yesterday with intervention, two stents placed to Ramus intermedius and L circumflex, radial approach site CDI, discharge to home tomorrow, tele SR with 1st Deg AVB.

## 2022-10-04 NOTE — PLAN OF CARE
Goal Outcome Evaluation:       0822-1057    A&O x 4. Pt denied pain. VSS, on RA. TR band off @ 2230 - CDI/CMS intact. Voiding in urinal. Tele SR w/1st deg AVB. Alarms on. Plan for possible discharge 10/4. Continue to monitor.

## 2022-10-05 VITALS
DIASTOLIC BLOOD PRESSURE: 91 MMHG | HEART RATE: 70 BPM | OXYGEN SATURATION: 96 % | RESPIRATION RATE: 18 BRPM | HEIGHT: 73 IN | WEIGHT: 191 LBS | TEMPERATURE: 98.1 F | BODY MASS INDEX: 25.31 KG/M2 | SYSTOLIC BLOOD PRESSURE: 156 MMHG

## 2022-10-05 LAB
MAGNESIUM SERPL-MCNC: 2.5 MG/DL (ref 1.6–2.3)
POTASSIUM BLD-SCNC: 4.1 MMOL/L (ref 3.4–5.3)

## 2022-10-05 PROCEDURE — 84132 ASSAY OF SERUM POTASSIUM: CPT | Performed by: INTERNAL MEDICINE

## 2022-10-05 PROCEDURE — 250N000013 HC RX MED GY IP 250 OP 250 PS 637: Performed by: STUDENT IN AN ORGANIZED HEALTH CARE EDUCATION/TRAINING PROGRAM

## 2022-10-05 PROCEDURE — 250N000013 HC RX MED GY IP 250 OP 250 PS 637: Performed by: INTERNAL MEDICINE

## 2022-10-05 PROCEDURE — 36415 COLL VENOUS BLD VENIPUNCTURE: CPT | Performed by: INTERNAL MEDICINE

## 2022-10-05 PROCEDURE — 250N000013 HC RX MED GY IP 250 OP 250 PS 637: Performed by: NURSE PRACTITIONER

## 2022-10-05 PROCEDURE — 83735 ASSAY OF MAGNESIUM: CPT | Performed by: INTERNAL MEDICINE

## 2022-10-05 RX ADMIN — PANTOPRAZOLE SODIUM 40 MG: 40 TABLET, DELAYED RELEASE ORAL at 08:32

## 2022-10-05 RX ADMIN — METOPROLOL TARTRATE 50 MG: 50 TABLET, FILM COATED ORAL at 08:32

## 2022-10-05 RX ADMIN — ROSUVASTATIN CALCIUM 40 MG: 20 TABLET, FILM COATED ORAL at 08:32

## 2022-10-05 RX ADMIN — VENLAFAXINE HYDROCHLORIDE 150 MG: 150 CAPSULE, EXTENDED RELEASE ORAL at 08:32

## 2022-10-05 RX ADMIN — ASPIRIN 81 MG: 81 TABLET, COATED ORAL at 08:32

## 2022-10-05 ASSESSMENT — ACTIVITIES OF DAILY LIVING (ADL)
ADLS_ACUITY_SCORE: 19

## 2022-10-05 NOTE — PROGRESS NOTES
Care Management Discharge Note    Discharge Date: 10/05/2022       Discharge Disposition: Home    Discharge Services: None    Discharge DME: None    Discharge Transportation: car, drives self, family or friend will provide    Private pay costs discussed: Not applicable    PAS Confirmation Code:    Patient/family educated on Medicare website which has current facility and service quality ratings:      Education Provided on the Discharge Plan:    Persons Notified of Discharge Plans: Bedside RN  Patient/Family in Agreement with the Plan: yes    Handoff Referral Completed: No    Additional Information:  Patient to discharge home with OP CR.  Cardiology follow up arranged and added to AVS.  No other discharge needs identified.    Dona King RN  Care Coordinator  [facility]  [phone] (text or call)

## 2022-10-05 NOTE — PLAN OF CARE
Physical Therapy Discharge Summary    Reason for therapy discharge:    Discharged to home with outpatient therapy.    Progress towards therapy goal(s). See goals on Care Plan in [facility] electronic health record for goal details.  Goals partially met.  Barriers to achieving goals:   discharge from facility.    Therapy recommendation(s):    Continued therapy is recommended.  Rationale/Recommendations:  For further cardiac education and endurance training.

## 2022-10-05 NOTE — DISCHARGE SUMMARY
[facility]  Discharge Summary        Candido Holliday MRN# [id number]   YOB: 1940 Age: 81 year old     Date of Admission:  9/30/2022  Date of Discharge:  10/4/2022  Admitting Physician:  Ervin Valdes MD  Discharge Physician: Ervin Valdes MD  Discharging Service: Hospitalist     Primary Provider:  Enrique Truong  Primary Care Physician Phone Number: [phone]         Discharge Diagnoses/Problem Oriented Hospital Course (Providers):    Candido Holliday was admitted on 9/30/2022 by Ervin Valdes MD and I would refer you to their history and physical.  The following problems were addressed during his hospitalization:    Candido Holliday is a 81 year old male with PMH GERD, depression, anxiety and SARITA who was admitted on 9/30/2022 with epigastric, lower sternal chest pain, weakness and dizziness that has progressed over the past 4-5 days with recent syncopal event.     NSTEMI with 2 vessel CAD s/p stenting of pCx and Gauri  Syncope  Admit with vague, but progressive symptoms that have been ongoing for the past several weeks and have worsened over the last 4-5 days, including SOB, dizziness, weakness, loss of appetite, and patient reported syncopal episode 4 days ago accompanied by dizziness and diaphoresis.                No personal or past family history of HTN, HLD or heart disease. Never a smoker and rare alcohol use. ECG with SR 1AVB and RBBB without obvious signs of ischemia.Troponin elevated at 4627-->5430-->4479. He was initiated on ASA and heparin infusion in the ED. He was admitted for Cardiology consult and further work-up. Echocardiogram Ef65-70% reportedly no WMA, final results pending.  - Appreciate Cardiology consult  - Angiogram showed 2 vessel disease that was stented (pCx + Gauri)  - if patient has further chest pain, than stress MRI to look for anterior wall ischemia.  - Lipid panel shows low HDL, started on crestor  - ASA + brilinta  - Continue  metoprolol and discontinue atenolol  - discharge with NTG     COVID-19  Elevated LFTs, improved  Patient's vague presenting complaints could reflect COVID-19 infection. COVID-19 PCR positive on admission 9/30/2022. Patient reports up to two weeks of symptoms prior to admission--along with slightly elevated LFTs no clear role for remdesivir here. CT chest no lung infiltrates. CRP has been low and trending down, likelypatient is recovered from COVID.  - Quarantine 10 days per protocol until 10/09/2022     Depression  Anxiety  During exam, generalized shaking and endorsing significant anxiety  - PTA regimen: Effexor 150 mg daily  - Continue Xanax BID PRN     SARITA  -CPAP at night         Code Status:      Full Code         Important Results:      See below         Pending Results:        Unresulted Labs Ordered in the Past 30 Days of this Admission     No orders found from 8/31/2022 to 10/1/2022.               Discharge Instructions and Follow-Up:      Follow-up Appointments     Follow-up and recommended labs and tests       Follow up with cardiology in the next 2 weeks as directed    Follow up with PCP in 2-3 weeks                  Discharge Disposition:      Discharged to home         Discharge Medications:        Current Discharge Medication List      START taking these medications    Details   aspirin (ASA) 81 MG chewable tablet Take 1 tablet (81 mg) by mouth daily Starting tomorrow.  Qty: 30 tablet, Refills: 3    Associated Diagnoses: Coronary artery disease involving native coronary artery of native heart with unstable angina pectoris (H)      metoprolol tartrate (LOPRESSOR) 50 MG tablet Take 1 tablet (50 mg) by mouth 2 times daily for 30 days  Qty: 60 tablet, Refills: 1    Associated Diagnoses: Coronary artery disease involving native coronary artery of native heart with unstable angina pectoris (H)      nitroGLYcerin (NITROSTAT) 0.4 MG sublingual tablet For chest pain place 1 tablet under the tongue every 5  minutes for 3 doses. If symptoms persist 5 minutes after 1st dose call 911.  Qty: 30 tablet, Refills: 1    Associated Diagnoses: Coronary artery disease involving native coronary artery of native heart with unstable angina pectoris (H)      rosuvastatin (CRESTOR) 40 MG tablet Take 1 tablet (40 mg) by mouth daily  Qty: 90 tablet, Refills: 3    Associated Diagnoses: Coronary artery disease involving native coronary artery of native heart with unstable angina pectoris (H)      ticagrelor (BRILINTA) 90 MG tablet Take 1 tablet (90 mg) by mouth every 12 hours for 30 days  Qty: 60 tablet, Refills: 1    Associated Diagnoses: Coronary artery disease involving native coronary artery of native heart with unstable angina pectoris (H)         CONTINUE these medications which have NOT CHANGED    Details   Ascorbic Acid (VITAMIN C PO) Take 1 tablet by mouth daily      ASPIRIN 81 MG OR TABS Take 81 mg by mouth daily      meloxicam (MOBIC) 15 MG tablet Take 15 mg by mouth daily      omeprazole (PRILOSEC) 40 MG DR capsule Take 40 mg by mouth daily      tamsulosin (FLOMAX) 0.4 MG capsule Take 0.4 mg by mouth daily      venlafaxine (EFFEXOR XR) 150 MG 24 hr capsule Take 150 mg by mouth daily      VITAMIN D PO Take 1 tablet by mouth daily         STOP taking these medications       atenolol (TENORMIN) 25 MG tablet Comments:   Reason for Stopping:                    Allergies:       No Known Allergies         Consultations This Hospital Stay:      Consultation during this admission received from cardiology          Discharge Orders for Skilled Facility (from Discharge Orders):        After Care Instructions     Activity      Your activity upon discharge: activity as tolerated         Activity - Cardiac Rehab      You are encouraged to enroll in an Outpatient Cardiac Rehab program after discharge from the hospital.  Our Cardiac Rehab staff may visit briefly with you while you're in the hospital.  If they miss you, someone will contact you  after you are home.         Comfort and Pain Management - Bruising after Surgery      Expect mild tingling of hand and tenderness at the wrist puncture site for up to 3 days. You may take Tylenol or a pain medicine recommended by your doctor.         Diet      Follow this diet upon discharge: Orders Placed This Encounter      Low Saturated Fat Na <2400 mg         Dressing Removal      Remove the band-aid on the puncture site after 24 hours and leave open to air. If minor oozing, you may apply a band-aid and remove after 12 hours         Medication Instructions - Anticoagulants      Do NOT stop your aspirin or platelet inhibitor unless directed by your Cardiologist.  These medications help to prevent platelets in your blood from sticking together and forming a clot.  Examples of these medications are:  Ticagrelor (Brilinta), Clopidigrel (Plavix), Prasugrel (Effient)         Precautions - Active Sports Activities      DO NOT engage in vigorous exercise using your affected arm for 3 days after discharge.  This includes golf, tennis or swimming.         Precautions - Elective Dental Work      NO elective dental work for 6 weeks after receiving a stent.         Precautions - Household Activities      Avoid excessive bending or movement of your wrist for 72 hours.  Do not subject hand/arm to any forceful movements for 24 hours, such as supporting weight when rising from a chair or bed.     Remove the band-aid on the puncture site after 24 hours and leave open to air. If minor oozing, you may apply a band-aid and remove after 12 hours.         Precautions - Lifting      DO NOT lift more than 5 pounds with affected arm for 48 hours         Precautions - Operating yard equipment or vehicles      Do not operate a chainsaw, lawnmower, motorcycle, or all-terrain vehicle for 48 hours after the procedure.         Return to Driving      Driving is NOT permitted for 24 hours after surgery         Return to work      You may return  to work after 72 hours if you are feeling well and your job does not involve heavy lifting.         Shower / Bathing      You may shower on the day after your procedure.  DO NOT soak of wrist with the puncture site in water for 3 days to prevent infection. DO NOT take a tub bath or wash dishes for 3 days after the procedure         When to call - Contact the Heart Clinic      You may experience symptoms that require follow-up before your scheduled appointment. Contact the Heart Clinic if you develop: Fever over 100.4o Fahrenheit, that lasts more than one day; Redness, heat, or pus at the puncture site; Change in color or temperature in your hand or arm.         When to call - Reasons to Call 911      If your wrist puncture site starts bleeding after discharge, sit down and apply firm pressure with your thumb against the puncture site and fingers against the back of the wrist for 10 minutes. If the bleeding stops, continue to rest, keeping your wrist still for 2 hours. Notify your doctor as soon as possible.  IF BLEEDING DOES NOT STOP OR THERE IS A LARGER AMOUNT OF BLEEDING OR SPURTING CALL 9-1-1 immediately.DO NOT drive yourself to the hospital.                    Rehab orders for Skilled Facility (from Discharge Orders):      Referrals     Future Labs/Procedures    Follow-Up with Cardiology TONI     Comments:    [facility] will call you to coordinate your care as prescribed by   your provider. If you have concerns about scheduling, please call   [phone].      CARDIAC REHAB REFERRAL     Process Instructions:    Advance to Wellness and Exercise for Life (WEL) Program or to another   maintenance exercise program upon completion of phase 2 cardiac rehab.    Weight mgt program is only offered at [facility].    *This therapy referral will be filtered to a centralized scheduling office   at [facility] and the patient will receive a call to   schedule an appointment at a [place] location most  convenient for them. *        Comments:    Patient may choose their preference of the site for Cardiac Rehab.  If you have not heard from the scheduling office within 2 business days,   please call [phone] for [facility], [phone] for [place] and   [phone] for [place].             Discharge Time:      Less than 30 minutes.        Image Results From This Hospital Stay (For Non-EPIC Providers):        Results for orders placed or performed during the hospital encounter of 09/30/22   CT Chest Pulmonary Embolism w Contrast    Narrative    EXAM: CT CHEST PULMONARY EMBOLISM W CONTRAST  LOCATION: [facility]  DATE/TIME: 9/30/2022 5:12 PM    INDICATION: Chest pain, elevated D-dimer.    COMPARISON: None.    TECHNIQUE: CT chest pulmonary angiogram during arterial phase injection of IV contrast. Multiplanar reformats and MIP reconstructions were performed. Dose reduction techniques were used.     CONTRAST: 75 mL Isovue 370.    FINDINGS:  ANGIOGRAM CHEST: Pulmonary arteries are normal caliber and negative for pulmonary emboli. Thoracic aorta is negative for dissection. No CT evidence of right heart strain.    LUNGS AND PLEURA: Elevated right hemidiaphragm. Trace dependent probable atelectasis versus less likely infiltrate. No effusions.    MEDIASTINUM/AXILLAE: No adenopathy or aneurysm.    CORONARY ARTERY CALCIFICATION: Moderate.    UPPER ABDOMEN: No acute findings.    MUSCULOSKELETAL: No frankly destructive bony lesions.      Impression    IMPRESSION:  1.  No pulmonary embolism demonstrated.  2.  Trace peripheral probable atelectasis versus less likely infiltrate.  3.  Elevated right hemidiaphragm.     Echo Limited     Value    LVEF  65-70%    Narrative    [id number]  [id number]  [id number]  [id number]^HAWLEY^MACEY         Cardiac Catheterization    Narrative      Ramus lesion is 90% stenosed.    Prox LAD to Mid LAD lesion is 60% stenosed.    Pressure wire/FFR was performed on the  "lesion.    Prox Cx lesion is 95% stenosed.     1.  Two vessel obstructive CAD, with culprit a 95% lesion of the proximal   LCx which appears acute.  There is also a severe lesion proximally within   a moderate-sized ramus.    2.  The moderate mid-LAD lesion is not flow-limiting by iFR (0.93) or FFR   (0.84).  Initial measurements suggested flow limitation, but these were   likely due to \"drift,\" and measurements were no longer significant after   repeat normalization.  3.  Successful HD-IVUS guided PCI of the proximal LCx with a single 3.5 x   15 mm Synergy JAMIN.  4.  Successful HD-IVUS guided PCI of the proximal ramus with a single 2.5   x 20 mm Synergy JAMIN, post-dilated up to 3.0 mm proximally.             Most Recent Lab Results In EPIC (For Non-EPIC Providers):    Most Recent 3 CBC's:  Recent Labs   Lab Test 10/03/22  1244 10/02/22  0644 10/01/22  0628   WBC 5.1 4.7 4.7   HGB 15.9 15.7 15.0   MCV 94 93 91    174 148*      Most Recent 3 BMP's:  Recent Labs   Lab Test 10/05/22  0649 10/04/22  0619 10/03/22  0638 10/02/22  0644   NA  --  137 139 138   POTASSIUM 4.1 3.9 4.3 4.1   CHLORIDE  --  105 106 105   CO2  --  28 27 28   BUN  --  18 18 17   CR  --  0.93 1.04 0.93   ANIONGAP  --  4 6 5   BHARTI  --  8.7 8.9 8.7   GLC  --  97 111* 101*     Most Recent 3 Troponin's:  Recent Labs   Lab Test 11/17/14  0930   TROPI <0.015  The 99th percentile for upper reference range is 0.045 ug/L.  Troponin values in   the range of 0.045 - 0.120 ug/L may be associated with risks of adverse   clinical events.   Effective 7/30/2014, the reference range for this assay has changed to reflect   new instrumentation/methodology.       Most Recent 3 INR's:  Recent Labs   Lab Test 09/30/22  1335   INR 1.01     Most Recent 2 LFT's:  Recent Labs   Lab Test 10/02/22  0644 10/01/22  0628   AST 49* 51*   ALT 66 69   ALKPHOS 47 46   BILITOTAL 1.1 1.3     Most Recent Cholesterol Panel:  Recent Labs   Lab Test 10/03/22  1833   CHOL 132   LDL 66 "   HDL 40   TRIG 131     Most Recent 6 Bacteria Isolates From Any Culture (See EPIC Reports for Culture Details):No lab results found.  Most Recent TSH, T4 and HgbA1c:No lab results found.

## 2022-10-05 NOTE — PLAN OF CARE
Goal Outcome Evaluation:       [id number]    A&O x 4. Pt denied pain. VSS, on RA. Up indep in room R radial site CDI. Tele SR w 1st deg AVB.  Plan for discharge 10/5. Continue to monitor.

## 2022-10-06 ENCOUNTER — PATIENT OUTREACH (OUTPATIENT)
Dept: CARE COORDINATION | Facility: CLINIC | Age: 82
End: 2022-10-06

## 2022-10-06 ENCOUNTER — TELEPHONE (OUTPATIENT)
Dept: CARDIOLOGY | Facility: CLINIC | Age: 82
End: 2022-10-06

## 2022-10-06 NOTE — PROGRESS NOTES
"Clinic Care Coordination Contact  [facility]: Post-Discharge Note  SITUATION                                                      Admission:    Admission Date: 09/30/22   Reason for Admission: epigastric, lower sternal chest pain, weakness and dizziness, near syncope  Discharge:   Discharge Date: 10/05/22  Discharge Diagnosis: NSTEMI with 2 vessel CAD s/p stenting of pCx and Gauri Syncope, COVID-19, elevated LFTs, depression, anxiety, SARITA    BACKGROUND                                                      Per hospital discharge summary and inpatient provider notes:    Candido oHlliday is a 81 year old male with PMH significant for GERD, depression, anxiety and SARITA who presented to the ED with complaints of epigastric, lower sternal chest pain, weakness and dizziness that has progressed over the past 4-5 days. Reports that he has had significant stress over the past few days and has had a prescription of alprazolam for the past 15 plus years. He took one tablet, daily in the past 3-4 days. States that he went into the bathroom when he had a syncopal episode. Reports that his son is in healthcare and had looked up the side effects of alprazolam on [facility]'s website and thought that his current use of this medication had contributed to his overall symptoms. He endorses loss of appetite. He was diaphoretic and dizzy prior to his syncopal episode and has not had a recurrence. Reports that he has had progressive shortness of breath in the past 4-5 days as well. No history of HTN, HLD or heart disease and no known family history. He takes ASA daily. Endorses that he has severe depression and has been following with his PCP closely on this. He is on Effexor and has a follow-up appointment with his PCP for dose adjustment on Wednesday.     Denies current chest pain/pressure. Has some shortness of breath. Denies dizziness and lightheadedness. Denies n/v/d. Reports some \"epigastic pain\" not relieved with GI cocktail. " "Work-up in the ED revealed unremarkable BMP and CBC. Mild elevation of ALT/AST without abdominal complaints. D-dimer elevated at 1.33 with CT Chest negative for PE. Troponin elevated at 4627. He was initiated on ASA and heparin infusion in the ED. He was admitted for Cardiology consult and further work-up.     ASSESSMENT      Discharge Assessment  How are you doing now that you are home?: \"Very good\"  How are your symptoms? (Red Flag symptoms escalate to triage hotline per guidelines): Improved  Do you feel your condition is stable enough to be safe at home until your provider visit?: Yes  Does the patient have their discharge instructions? : Yes  Does the patient have questions regarding their discharge instructions? : No  Were you started on any new medications or were there changes to any of your previous medications? : Yes  Does the patient have all of their medications?: Yes  Do you have questions regarding any of your medications? : No  Do you have all of your needed medical supplies or equipment (DME)?  (i.e. oxygen tank, CPAP, cane, etc.): Yes  Discharge follow-up appointment scheduled within 14 calendar days? : Yes  Discharge Follow Up Appointment Date: 10/21/22  Discharge Follow Up Appointment Scheduled with?:  (Pt did not state if he was seeing his PCP or a specialist for his follow up appointment)  Is patient agreeable to assistance with scheduling? : No    Post-op ([facility] Only)  If the patient had a surgery or procedure, do they have any questions for a nurse?: No    PLAN                                                      Outpatient Plan:     Follow up with cardiology in the next 2 weeks as directed  Follow up with PCP in 2-3 weeks    Future Appointments   Date Time Provider Department Center   10/21/2022  4:00 PM Maryam Gipson PA-C [facility]         For any urgent concerns, please contact our 24 hour nurse triage line: [phone] ([phone])       Sintia " Jerome  Community Health Worker  [facility]  Ph: [phone]

## 2022-10-06 NOTE — TELEPHONE ENCOUNTER
Patient was admitted to [facility] on 9/30/22 with epigastric, left lower sternal chest pain, syncope and COVID 19 PNA with elevated troponin-NSTEMI..    PMH: GERD, depression, anxiety and SARITA.    Echo showed EF of 65% without WMA's.    10/3/22: Coronary angiogram via RRA showed three-vessel disease and underwent PCI of circumflex and ramus intermedius. FFR of proximal LAD was negative. In the future, if he has chest pain will consider cardiac stress MRI or nuclear stress to rule out ischemia in the anterior wall.     Pt was started on Brilinta, ASA, NTG, Crestor and Metoprolol, and PTA Atenolol was discontinued at time of discharge.    Called patient to discuss any post hospital d/c questions, review medication changes, and confirm f/u appts. Patient denied any questions regarding new medications or changes to PTA medications.     RN confirmed with patient that he was d/c with an adequate supply of the antiplatelet Brilinta, and reminded of importance of taking without interruption.     Pt has an Rx for PRN SL Nitroglycerin.     Patient denied any SOB, chest pain, fever or light headedness.     RRA cardiac cath site is without bleeding, swelling, redness or signs of infection.     RN confirmed with patient that he has an OV scheduled on 10/21/22 at 1355 with WENDI Liyah Gipson at our [facility].     Cardiac rehab-pt has declined..     Patient advised to call clinic with any cardiac related questions or concerns prior to this wendi't. Patient verbalized understanding and agreed with plan. DIANE Ignacio RN.

## 2022-10-07 LAB
ATRIAL RATE - MUSE: 61 BPM
ATRIAL RATE - MUSE: 65 BPM
ATRIAL RATE - MUSE: 75 BPM
DIASTOLIC BLOOD PRESSURE - MUSE: NORMAL MMHG
INTERPRETATION ECG - MUSE: NORMAL
P AXIS - MUSE: 27 DEGREES
P AXIS - MUSE: 3 DEGREES
P AXIS - MUSE: 48 DEGREES
PR INTERVAL - MUSE: 304 MS
PR INTERVAL - MUSE: 312 MS
PR INTERVAL - MUSE: 334 MS
QRS DURATION - MUSE: 132 MS
QRS DURATION - MUSE: 132 MS
QRS DURATION - MUSE: 142 MS
QT - MUSE: 410 MS
QT - MUSE: 422 MS
QT - MUSE: 430 MS
QTC - MUSE: 424 MS
QTC - MUSE: 447 MS
QTC - MUSE: 457 MS
R AXIS - MUSE: 21 DEGREES
R AXIS - MUSE: 61 DEGREES
R AXIS - MUSE: 75 DEGREES
SYSTOLIC BLOOD PRESSURE - MUSE: NORMAL MMHG
T AXIS - MUSE: 52 DEGREES
T AXIS - MUSE: 61 DEGREES
T AXIS - MUSE: 65 DEGREES
VENTRICULAR RATE- MUSE: 61 BPM
VENTRICULAR RATE- MUSE: 65 BPM
VENTRICULAR RATE- MUSE: 75 BPM

## 2022-10-16 ENCOUNTER — HEALTH MAINTENANCE LETTER (OUTPATIENT)
Age: 82
End: 2022-10-16

## 2022-10-21 ENCOUNTER — OFFICE VISIT (OUTPATIENT)
Dept: CARDIOLOGY | Facility: CLINIC | Age: 82
End: 2022-10-21
Attending: PHYSICIAN ASSISTANT
Payer: MEDICARE

## 2022-10-21 VITALS
HEIGHT: 73 IN | SYSTOLIC BLOOD PRESSURE: 137 MMHG | WEIGHT: 199 LBS | OXYGEN SATURATION: 96 % | HEART RATE: 60 BPM | BODY MASS INDEX: 26.37 KG/M2 | DIASTOLIC BLOOD PRESSURE: 71 MMHG

## 2022-10-21 DIAGNOSIS — I25.110 CORONARY ARTERY DISEASE INVOLVING NATIVE CORONARY ARTERY OF NATIVE HEART WITH UNSTABLE ANGINA PECTORIS (H): Primary | ICD-10-CM

## 2022-10-21 DIAGNOSIS — I21.4 NSTEMI (NON-ST ELEVATED MYOCARDIAL INFARCTION) (H): ICD-10-CM

## 2022-10-21 PROCEDURE — 99214 OFFICE O/P EST MOD 30 MIN: CPT | Performed by: PHYSICIAN ASSISTANT

## 2022-10-21 RX ORDER — ROSUVASTATIN CALCIUM 40 MG/1
40 TABLET, COATED ORAL DAILY
Qty: 90 TABLET | Refills: 3 | Status: SHIPPED | OUTPATIENT
Start: 2022-10-21

## 2022-10-21 RX ORDER — METOPROLOL TARTRATE 50 MG
50 TABLET ORAL 2 TIMES DAILY
Qty: 180 TABLET | Refills: 3 | Status: SHIPPED | OUTPATIENT
Start: 2022-10-21

## 2022-10-21 NOTE — PROGRESS NOTES
Cardiology Progress Note    Patient seen today in follow up of: post hospital follow up  Primary cardiologist: will be Dr. Pinto    HPI:  Candido Holliday is a very pleasant 81 year old male with a history of hypertension, GERD, SARITA who was admitted to [facility] on 9/30/22 with epigastric and substernal chest pain associated with shortness of breath weakness and dizziness. He noted those symptoms a few days prior to admission and subsequently presented to the ED. He was found to be positive for COVID-19. A chest CT was done which was negative for PE but showed calcification in the LAD territory. His initial troponin was quite elevated at 4600 and peaked at 5400. Echo showed normal LV size and systolic function without clear WMAs although the images of the anterior wall were suboptimal. He was started on IV heparin. Cardiology was consulted and he was seen by Dr. Pinto.    A nuclear stress was performed given his atypical symptoms and unremarkable EKG and echo however he was unable to raise his arms above his head to facilitate this study.  A CT coronary angiogram was defererd given his calcification noted on CT and thus he ultimately was send for coronary angiography. 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 He had a proximal LCx and ramus lesion which were stented. If he has recurrent angina in the future, stress testing to look for ischemia in the anterior wall was recommended. He was discharged on Crestor, aspirin, Brilinta and metoprolol.    He is back today in follow up. He presents with his wife. He notes he is feeling very well since discharge. He denies recurrent chest pain. No dyspnea on exertion. His radial access site has healed well. He is quite active and exercises regularly. He does not want to participate in cardiac rehab. BP has been under good control.      ASSESSMENT/PLAN:  Candido Holliday is a very pleasant 81 year  old male with a history of SARITA, hypertension, and GERD who was recently admitted with chest pain/dypspnea and found to have a NSTEMI as well as COVID-19 infection. He underwent coronary angiography and was found to have a significant proximal LCx and 90% ramus lesion which were treated with two JAMIN. He has normal LV function. He is on appropriate medical therapy with aspirin and Brilinta. We discussed the importance of this. Additionally, he is on metoprolol 50 mg BID and crestor 40 mg daily. LDL was 40.     He is doing quite well without recurrent anginal symptoms since discharge. He is active and I encouraged him to continue with regular exercise. He declines cardiac rehab referral.    I did not after his visit that he is on daily Meloxicam. Given his MI and DAPT, would recommend discussion with his PCP to see if there are any alternatives.     I will have him return to see Dr. Pinto in 4 - 6 months. He will contact us sooner with any questions or concerns.     Orders this Visit:  Orders Placed This Encounter   Procedures     Follow-Up with Cardiology     Orders Placed This Encounter   Medications     metoprolol tartrate (LOPRESSOR) 50 MG tablet     Sig: Take 1 tablet (50 mg) by mouth 2 times daily     Dispense:  180 tablet     Refill:  3     rosuvastatin (CRESTOR) 40 MG tablet     Sig: Take 1 tablet (40 mg) by mouth daily     Dispense:  90 tablet     Refill:  3     ticagrelor (BRILINTA) 90 MG tablet     Sig: Take 1 tablet (90 mg) by mouth every 12 hours     Dispense:  180 tablet     Refill:  3     Medications Discontinued During This Encounter   Medication Reason     rosuvastatin (CRESTOR) 40 MG tablet Reorder     metoprolol tartrate (LOPRESSOR) 50 MG tablet Reorder     ticagrelor (BRILINTA) 90 MG tablet Reorder       CURRENT MEDICATIONS:  Current Outpatient Medications   Medication Sig Dispense Refill     Ascorbic Acid (VITAMIN C PO) Take 1 tablet by mouth daily       meloxicam (MOBIC) 15 MG tablet Take 15 mg  by mouth daily       metoprolol tartrate (LOPRESSOR) 50 MG tablet Take 1 tablet (50 mg) by mouth 2 times daily 180 tablet 3     nitroGLYcerin (NITROSTAT) 0.4 MG sublingual tablet For chest pain place 1 tablet under the tongue every 5 minutes for 3 doses. If symptoms persist 5 minutes after 1st dose call 911. 30 tablet 1     omeprazole (PRILOSEC) 40 MG DR capsule Take 40 mg by mouth daily       rosuvastatin (CRESTOR) 40 MG tablet Take 1 tablet (40 mg) by mouth daily 90 tablet 3     tamsulosin (FLOMAX) 0.4 MG capsule Take 0.4 mg by mouth daily       ticagrelor (BRILINTA) 90 MG tablet Take 1 tablet (90 mg) by mouth every 12 hours 180 tablet 3     venlafaxine (EFFEXOR XR) 150 MG 24 hr capsule Take 150 mg by mouth daily       VITAMIN D PO Take 1 tablet by mouth daily       aspirin (ASA) 81 MG chewable tablet Take 1 tablet (81 mg) by mouth daily Starting tomorrow. 30 tablet 3     ALLERGIES  No Known Allergies  PAST MEDICAL HISTORY:  Past Medical History:   Diagnosis Date     Depressive disorder      Gastro-oesophageal reflux disease      PAST SURGICAL HISTORY:  Past Surgical History:   Procedure Laterality Date     CV CORONARY ANGIOGRAM N/A 10/3/2022    Procedure: Coronary Angiogram;  Surgeon: Jasmeet Lopez MD;  Location: [facility] CARDIAC CATH LAB     CV FRACTIONAL FLOW RATIO WIRE N/A 10/3/2022    Procedure: Fractional Flow Ratio Wire;  Surgeon: Jasmeet Lopez MD;  Location: [facility] CARDIAC CATH LAB     CV INTRAVASULAR ULTRASOUND N/A 10/3/2022    Procedure: Intravascular Ultrasound;  Surgeon: Jasmeet Lopez MD;  Location: [facility] CARDIAC CATH LAB     CV PCI N/A 10/3/2022    Procedure: Percutaneous Coronary Intervention;  Surgeon: Jasmeet Lopez MD;  Location: [facility] CARDIAC CATH LAB     ORTHOPEDIC SURGERY       FAMILY HISTORY:  History reviewed. No pertinent family history.  SOCIAL HISTORY:  Social History     Socioeconomic History     Marital status:      Spouse name: None  "    Number of children: None     Years of education: None     Highest education level: None   Tobacco Use     Smoking status: Never   Substance and Sexual Activity     Alcohol use: Yes     Comment: socially     Drug use: No     Review of Systems:  Skin:        Eyes:       ENT:       Respiratory:  Negative    Cardiovascular:  Negative    Gastroenterology:      Genitourinary:       Musculoskeletal:       Neurologic:       Psychiatric:       Heme/Lymph/Imm:       Endocrine:          Physical Exam:  Vitals: /71   Pulse 60   Ht 1.854 m (6' 1\")   Wt 90.3 kg (199 lb)   SpO2 96%   BMI 26.25 kg/m     Wt Readings from Last 4 Encounters:   10/21/22 90.3 kg (199 lb)   10/04/22 86.6 kg (191 lb)   11/17/14 95.3 kg (210 lb)   05/20/08 93.4 kg (206 lb)     GEN: well nourished, in no acute distress.  HEENT:  Pupils equal, round. Sclerae nonicteric.   C/V:  Regular rate and rhythm, no murmur, rub or gallop.    RESP: Respirations are unlabored. Clear to auscultation bilaterally without wheezing, rales, or rhonchi.  GI: Abdomen soft, nontender.  EXTREM: no LE edema.  NEURO: Alert and oriented, cooperative.  SKIN: Warm and dry.     Recent Lab Results:  LIPID RESULTS:  Lab Results   Component Value Date    CHOL 132 10/03/2022    HDL 40 10/03/2022    LDL 66 10/03/2022    TRIG 131 10/03/2022     LIVER ENZYME RESULTS:  Lab Results   Component Value Date    AST 49 (H) 10/02/2022    ALT 66 10/02/2022     CBC RESULTS:  Lab Results   Component Value Date    WBC 5.1 10/03/2022    WBC 4.5 11/17/2014    RBC 5.14 10/03/2022    RBC 5.08 11/17/2014    HGB 15.9 10/03/2022    HGB 15.9 11/17/2014    HCT 48.1 10/03/2022    HCT 46.3 11/17/2014    MCV 94 10/03/2022    MCV 91 11/17/2014    MCH 30.9 10/03/2022    MCH 31.3 11/17/2014    MCHC 33.1 10/03/2022    MCHC 34.3 11/17/2014    RDW 13.2 10/03/2022    RDW 13.0 11/17/2014     10/03/2022     11/17/2014     BMP RESULTS:  Lab Results   Component Value Date     10/04/2022    NA " 142 11/17/2014    POTASSIUM 4.1 10/05/2022    POTASSIUM 4.2 11/17/2014    CHLORIDE 105 10/04/2022    CHLORIDE 109 11/17/2014    CO2 28 10/04/2022    CO2 27 11/17/2014    ANIONGAP 4 10/04/2022    ANIONGAP 6 11/17/2014    GLC 97 10/04/2022    GLC 97 11/17/2014    BUN 18 10/04/2022    BUN 11 11/17/2014    CR 0.93 10/04/2022    CR 1.05 11/17/2014    GFRESTIMATED 82 10/04/2022    GFRESTIMATED 69 11/17/2014    GFRESTBLACK 83 11/17/2014    BHARTI 8.7 10/04/2022    BHARTI 9.2 11/17/2014      A1C RESULTS:  No results found for: A1C  INR RESULTS:  Lab Results   Component Value Date    INR 1.01 09/30/2022    INR 0.94 11/19/2009       Maryam Gipson PA-C  [facility]

## 2022-10-21 NOTE — PATIENT INSTRUCTIONS
"Thank you for your [facility] visit today. Your provider has recommended the following:  Medication Changes:  No medication changes today.   Recommendations:  Call us with any concerns.  Follow-up:  See Dr. iPnto in 4 months or so - call us sooner with concerns.  Reminder:  Please bring in all current medications, over the counter supplements and vitamin bottles to your next appointment.  Important \"[facility]\" telephone numbers for your reference:  Cardiology Scheduling - [phone]  Cardiology Clinic RN-  [phone] ([facility]    "

## 2022-10-21 NOTE — LETTER
10/21/2022    Enrique Truong MD  [facility] [address] [place] [address]    RE: Candido Holliday       Dear Colleague,     I had the pleasure of seeing Candido Holliday in the [facility].    Cardiology Progress Note    Patient seen today in follow up of: post hospital follow up  Primary cardiologist: will be Dr. Pinto    HPI:  Candido Holliday is a very pleasant 81 year old male with a history of hypertension, GERD, SARITA who was admitted to [facility] on 9/30/22 with epigastric and substernal chest pain associated with shortness of breath weakness and dizziness. He noted those symptoms a few days prior to admission and subsequently presented to the ED. He was found to be positive for COVID-19. A chest CT was done which was negative for PE but showed calcification in the LAD territory. His initial troponin was quite elevated at 4600 and peaked at 5400. Echo showed normal LV size and systolic function without clear WMAs although the images of the anterior wall were suboptimal. He was started on IV heparin. Cardiology was consulted and he was seen by Dr. Pinto.    A nuclear stress was performed given his atypical symptoms and unremarkable EKG and echo however he was unable to raise his arms above his head to facilitate this study.  A CT coronary angiogram was defererd given his calcification noted on CT and thus he ultimately was send for coronary angiography. 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 He had a proximal LCx and ramus lesion which were stented. If he has recurrent angina in the future, stress testing to look for ischemia in the anterior wall was recommended. He was discharged on Crestor, aspirin, Brilinta and metoprolol.    He is back today in follow up. He presents with his wife. He notes he is feeling very well since discharge. He denies recurrent chest pain. No dyspnea on exertion. His  radial access site has healed well. He is quite active and exercises regularly. He does not want to participate in cardiac rehab. BP has been under good control.      ASSESSMENT/PLAN:  Candido Holliday is a very pleasant 81 year old male with a history of SARITA, hypertension, and GERD who was recently admitted with chest pain/dypspnea and found to have a NSTEMI as well as COVID-19 infection. He underwent coronary angiography and was found to have a significant proximal LCx and 90% ramus lesion which were treated with two JAMIN. He has normal LV function. He is on appropriate medical therapy with aspirin and Brilinta. We discussed the importance of this. Additionally, he is on metoprolol 50 mg BID and crestor 40 mg daily. LDL was 40.     He is doing quite well without recurrent anginal symptoms since discharge. He is active and I encouraged him to continue with regular exercise. He declines cardiac rehab referral.    I did not after his visit that he is on daily Meloxicam. Given his MI and DAPT, would recommend discussion with his PCP to see if there are any alternatives.     I will have him return to see Dr. Pinto in 4 - 6 months. He will contact us sooner with any questions or concerns.     Orders this Visit:  Orders Placed This Encounter   Procedures     Follow-Up with Cardiology     Orders Placed This Encounter   Medications     metoprolol tartrate (LOPRESSOR) 50 MG tablet     Sig: Take 1 tablet (50 mg) by mouth 2 times daily     Dispense:  180 tablet     Refill:  3     rosuvastatin (CRESTOR) 40 MG tablet     Sig: Take 1 tablet (40 mg) by mouth daily     Dispense:  90 tablet     Refill:  3     ticagrelor (BRILINTA) 90 MG tablet     Sig: Take 1 tablet (90 mg) by mouth every 12 hours     Dispense:  180 tablet     Refill:  3     Medications Discontinued During This Encounter   Medication Reason     rosuvastatin (CRESTOR) 40 MG tablet Reorder     metoprolol tartrate (LOPRESSOR) 50 MG tablet Reorder     ticagrelor  (BRILINTA) 90 MG tablet Reorder       CURRENT MEDICATIONS:  Current Outpatient Medications   Medication Sig Dispense Refill     Ascorbic Acid (VITAMIN C PO) Take 1 tablet by mouth daily       meloxicam (MOBIC) 15 MG tablet Take 15 mg by mouth daily       metoprolol tartrate (LOPRESSOR) 50 MG tablet Take 1 tablet (50 mg) by mouth 2 times daily 180 tablet 3     nitroGLYcerin (NITROSTAT) 0.4 MG sublingual tablet For chest pain place 1 tablet under the tongue every 5 minutes for 3 doses. If symptoms persist 5 minutes after 1st dose call 911. 30 tablet 1     omeprazole (PRILOSEC) 40 MG DR capsule Take 40 mg by mouth daily       rosuvastatin (CRESTOR) 40 MG tablet Take 1 tablet (40 mg) by mouth daily 90 tablet 3     tamsulosin (FLOMAX) 0.4 MG capsule Take 0.4 mg by mouth daily       ticagrelor (BRILINTA) 90 MG tablet Take 1 tablet (90 mg) by mouth every 12 hours 180 tablet 3     venlafaxine (EFFEXOR XR) 150 MG 24 hr capsule Take 150 mg by mouth daily       VITAMIN D PO Take 1 tablet by mouth daily       aspirin (ASA) 81 MG chewable tablet Take 1 tablet (81 mg) by mouth daily Starting tomorrow. 30 tablet 3     ALLERGIES  No Known Allergies  PAST MEDICAL HISTORY:  Past Medical History:   Diagnosis Date     Depressive disorder      Gastro-oesophageal reflux disease      PAST SURGICAL HISTORY:  Past Surgical History:   Procedure Laterality Date     CV CORONARY ANGIOGRAM N/A 10/3/2022    Procedure: Coronary Angiogram;  Surgeon: Jasmeet Lopez MD;  Location: [facility] CARDIAC CATH LAB     CV FRACTIONAL FLOW RATIO WIRE N/A 10/3/2022    Procedure: Fractional Flow Ratio Wire;  Surgeon: Jasmeet Lopez MD;  Location: [facility] CARDIAC CATH LAB     CV INTRAVASULAR ULTRASOUND N/A 10/3/2022    Procedure: Intravascular Ultrasound;  Surgeon: Jasmeet Lopez MD;  Location: [facility] CARDIAC CATH LAB     CV PCI N/A 10/3/2022    Procedure: Percutaneous Coronary Intervention;  Surgeon: Jasmeet Lopez MD;   "Location:  HEART CARDIAC CATH LAB     ORTHOPEDIC SURGERY       FAMILY HISTORY:  History reviewed. No pertinent family history.  SOCIAL HISTORY:  Social History     Socioeconomic History     Marital status:      Spouse name: None     Number of children: None     Years of education: None     Highest education level: None   Tobacco Use     Smoking status: Never   Substance and Sexual Activity     Alcohol use: Yes     Comment: socially     Drug use: No     Review of Systems:  Skin:        Eyes:       ENT:       Respiratory:  Negative    Cardiovascular:  Negative    Gastroenterology:      Genitourinary:       Musculoskeletal:       Neurologic:       Psychiatric:       Heme/Lymph/Imm:       Endocrine:          Physical Exam:  Vitals: /71   Pulse 60   Ht 1.854 m (6' 1\")   Wt 90.3 kg (199 lb)   SpO2 96%   BMI 26.25 kg/m     Wt Readings from Last 4 Encounters:   10/21/22 90.3 kg (199 lb)   10/04/22 86.6 kg (191 lb)   11/17/14 95.3 kg (210 lb)   05/20/08 93.4 kg (206 lb)     GEN: well nourished, in no acute distress.  HEENT:  Pupils equal, round. Sclerae nonicteric.   C/V:  Regular rate and rhythm, no murmur, rub or gallop.    RESP: Respirations are unlabored. Clear to auscultation bilaterally without wheezing, rales, or rhonchi.  GI: Abdomen soft, nontender.  EXTREM: no LE edema.  NEURO: Alert and oriented, cooperative.  SKIN: Warm and dry.     Recent Lab Results:  LIPID RESULTS:  Lab Results   Component Value Date    CHOL 132 10/03/2022    HDL 40 10/03/2022    LDL 66 10/03/2022    TRIG 131 10/03/2022     LIVER ENZYME RESULTS:  Lab Results   Component Value Date    AST 49 (H) 10/02/2022    ALT 66 10/02/2022     CBC RESULTS:  Lab Results   Component Value Date    WBC 5.1 10/03/2022    WBC 4.5 11/17/2014    RBC 5.14 10/03/2022    RBC 5.08 11/17/2014    HGB 15.9 10/03/2022    HGB 15.9 11/17/2014    HCT 48.1 10/03/2022    HCT 46.3 11/17/2014    MCV 94 10/03/2022    MCV 91 11/17/2014    MCH 30.9 10/03/2022    " MCH 31.3 11/17/2014    MCHC 33.1 10/03/2022    MCHC 34.3 11/17/2014    RDW 13.2 10/03/2022    RDW 13.0 11/17/2014     10/03/2022     11/17/2014     BMP RESULTS:  Lab Results   Component Value Date     10/04/2022     11/17/2014    POTASSIUM 4.1 10/05/2022    POTASSIUM 4.2 11/17/2014    CHLORIDE 105 10/04/2022    CHLORIDE 109 11/17/2014    CO2 28 10/04/2022    CO2 27 11/17/2014    ANIONGAP 4 10/04/2022    ANIONGAP 6 11/17/2014    GLC 97 10/04/2022    GLC 97 11/17/2014    BUN 18 10/04/2022    BUN 11 11/17/2014    CR 0.93 10/04/2022    CR 1.05 11/17/2014    GFRESTIMATED 82 10/04/2022    GFRESTIMATED 69 11/17/2014    GFRESTBLACK 83 11/17/2014    BHARTI 8.7 10/04/2022    BHARTI 9.2 11/17/2014      A1C RESULTS:  No results found for: A1C  INR RESULTS:  Lab Results   Component Value Date    INR 1.01 09/30/2022    INR 0.94 11/19/2009       Maryam Gipson PA-C  [facility]      Thank you for allowing me to participate in the care of your patient.      Sincerely,     Maryam Gipson PA-C     [facility]  cc:   Maryam Gipson PA-C  [address]  [place] [id number]

## 2022-10-27 ENCOUNTER — TELEPHONE (OUTPATIENT)
Dept: CARDIOLOGY | Facility: CLINIC | Age: 82
End: 2022-10-27

## 2022-10-27 NOTE — TELEPHONE ENCOUNTER
----- Message from Maryam Gipson PA-C sent at 10/27/2022  2:42 PM CDT -----  Sherif Newton - I noticed after Candido's visit he has meloxicam listed on his list as a daily medication. Can you follow up and see if he taking that daily? If so, I'd discuss with his PCP if there is an alternative options for meds given his recent NSTEMI and need for DAPT. It looks like he has been on this for some time. Thanks. Liyah    ==========================    10/27/2022 Called to patient with Liyah's comments and recommendations. Patient reports he is taking the meloxicam Daily for the last 5 years for arthritis pain. Patient states understanding of Jackies recommendations, bleeding risks with antiinflammatories,  and request for him to discuss with his Primary Care MD to look for alternative options. Patient reports he will contact his Primary Care for stopping Meloxicam and discuss alternatives.   Reviewed with patient Rx drug warnings on metoxicam from website -  IMPORTANT NOTE: Speak with your physician before taking meloxicam with any oral anti coagulation or anti platelet medication since meloxicam can increase risk of bleeding. Some of these medications include: Apixaban (Eliquis), Rivaroxaban (Xarelto) , Dabigatran (Pradaxa), Warfarin (Jantoven), Clopidogrel (Plavix) , Ticagrelor (Brilinta), etc.  Note: Meloxicam is a strong NSAID, and it is very important to communicate to all healthcare providers that you take this medication. Sometimes physicians will have you stop taking this medication in anticipation of procedures, lifestyle changes, or due to new medications prescribed to you.  Lamar Adam RN 10/27/22 4:55 PM

## 2023-01-19 ENCOUNTER — CARE COORDINATION (OUTPATIENT)
Dept: CARDIOLOGY | Facility: CLINIC | Age: 83
End: 2023-01-19
Payer: MEDICARE

## 2023-01-19 NOTE — PROGRESS NOTES
1/19/2023 Call from patient with complaints of muscle aches and pains of an 8 out of 10 and slight headache all the time. Candido is on statin Crestor and  reports his wife had a similar complaints when she was on a statin. Candido is going to stop the crestor over the weekend and see if this helps and call with an update next week for Liyah to review. Messaged to Liyah Gipson PA-C.  Lamar Adam RN 01/19/23 10:58 AM    Maryam Gipson PA-C  1/19/23 (12:35 PM)     Okay, thanks.     ========================    1/26/23 Message from Patient to give updated. Attempted to call patient back - left message to call back.  Lamar Adam RN 01/26/23 9:51 AM

## 2023-02-02 NOTE — PROGRESS NOTES
2/2/23 Patient reported seen by Family Medicine 1/31/23 and statin was addressed -see office note note in EPIC  Lamar Adam RN 02/02/23 1:53 PM

## 2023-03-30 ENCOUNTER — OFFICE VISIT (OUTPATIENT)
Dept: CARDIOLOGY | Facility: CLINIC | Age: 83
End: 2023-03-30
Attending: PHYSICIAN ASSISTANT
Payer: MEDICARE

## 2023-03-30 VITALS
BODY MASS INDEX: 27.43 KG/M2 | HEIGHT: 73 IN | HEART RATE: 64 BPM | DIASTOLIC BLOOD PRESSURE: 75 MMHG | WEIGHT: 207 LBS | SYSTOLIC BLOOD PRESSURE: 128 MMHG | OXYGEN SATURATION: 96 %

## 2023-03-30 DIAGNOSIS — I21.4 NSTEMI (NON-ST ELEVATED MYOCARDIAL INFARCTION) (H): ICD-10-CM

## 2023-03-30 DIAGNOSIS — I25.110 CORONARY ARTERY DISEASE INVOLVING NATIVE CORONARY ARTERY OF NATIVE HEART WITH UNSTABLE ANGINA PECTORIS (H): ICD-10-CM

## 2023-03-30 PROCEDURE — 99214 OFFICE O/P EST MOD 30 MIN: CPT | Performed by: INTERNAL MEDICINE

## 2023-03-30 NOTE — PROGRESS NOTES
Cardiology Clinic Progress Note      Today I had the pleasure of seeing Candido Holliday at [facility]. He is a pleasant 82 year old patient with a past medical history of coronary artery disease, hypertension and hyperlipidemia presents to the clinic for a follow-up visit.    I had initially seen him in 10/2022 during hospital admission for NSTEMI.  He underwent a coronary angiogram at that time which showed three-vessel coronary artery disease.  He refused bypass surgery and hence percutaneous revascularization was performed to his circumflex and ramus intermedius.  He also has a lesion in the LAD which was not significant by hemodynamic assessment.  He has been on dual antiplatelet therapy and other appropriate cardiac medications since.  He is tolerating these medications well although he reports getting bruise easily.  His LV systolic function is normal.  I interpreted his echocardiogram today.  I also interpreted his EKG which shows sinus rhythm with right bundle block and first-degree AV block.    Assessment and plan:    1.  Coronary disease s/p PCI of circumflex and ramus  2.  Residual disease in LAD which is negative hemodynamic assessment  3.  Essential hypertension  4.  Hyperlipidemia    It was a pleasure to meet Candido Holliday in clinic today.  I believe that he is doing well and has not had any complaints since undergoing coronary revascularization.  I have told him to continue to exercise on a regular basis and take all his medications as prescribed.  In 6 months time he will stop Brilinta and continue aspirin indefinitely.  All other medications will be continued indefinitely.  I will have him come back and see me in 1 year or sooner if needed.    All medical records were reviewed in detail and discussed with the patient. Greater than 30 mins were spent with the patient, 50% of this time was spent on counseling and coordination of care.  After visit summary was printed and  given to the patient.    Thank you for allowing me to participate in the care of Candido Holliday    This note was completed in part using Dragon voice recognition software. Although reviewed after completion, some word and grammatical errors may occur.    Ar Pinto MD  Cardiology    No orders of the defined types were placed in this encounter.      No orders of the defined types were placed in this encounter.      Medications Discontinued During This Encounter   Medication Reason     meloxicam (MOBIC) 15 MG tablet        Encounter Diagnoses   Name Primary?     NSTEMI (non-ST elevated myocardial infarction) (H)      Coronary artery disease involving native coronary artery of native heart with unstable angina pectoris (H)        CURRENT MEDICATIONS:  Current Outpatient Medications   Medication Sig Dispense Refill     Ascorbic Acid (VITAMIN C PO) Take 1 tablet by mouth daily       aspirin (ASA) 81 MG chewable tablet Take 1 tablet (81 mg) by mouth daily Starting tomorrow. 30 tablet 3     metoprolol tartrate (LOPRESSOR) 50 MG tablet Take 1 tablet (50 mg) by mouth 2 times daily 180 tablet 3     nitroGLYcerin (NITROSTAT) 0.4 MG sublingual tablet For chest pain place 1 tablet under the tongue every 5 minutes for 3 doses. If symptoms persist 5 minutes after 1st dose call 911. 30 tablet 1     omeprazole (PRILOSEC) 40 MG DR capsule Take 40 mg by mouth daily       rosuvastatin (CRESTOR) 40 MG tablet Take 1 tablet (40 mg) by mouth daily 90 tablet 3     tamsulosin (FLOMAX) 0.4 MG capsule Take 0.4 mg by mouth daily       ticagrelor (BRILINTA) 90 MG tablet Take 1 tablet (90 mg) by mouth every 12 hours 180 tablet 3     venlafaxine (EFFEXOR XR) 150 MG 24 hr capsule Take 150 mg by mouth daily       VITAMIN D PO Take 1 tablet by mouth daily         ALLERGIES   No Known Allergies    PAST MEDICAL HISTORY:  Past Medical History:   Diagnosis Date     Depressive disorder      Gastro-oesophageal reflux disease        PAST SURGICAL  "HISTORY:  Past Surgical History:   Procedure Laterality Date     CV CORONARY ANGIOGRAM N/A 10/3/2022    Procedure: Coronary Angiogram;  Surgeon: Jasmeet Lopez MD;  Location: [facility] CARDIAC CATH LAB     CV FRACTIONAL FLOW RATIO WIRE N/A 10/3/2022    Procedure: Fractional Flow Ratio Wire;  Surgeon: Jasmeet Lopez MD;  Location: [facility] CARDIAC CATH LAB     CV INTRAVASULAR ULTRASOUND N/A 10/3/2022    Procedure: Intravascular Ultrasound;  Surgeon: Jasmeet Lopez MD;  Location: [facility] CARDIAC CATH LAB     CV PCI N/A 10/3/2022    Procedure: Percutaneous Coronary Intervention;  Surgeon: Jasmeet Lopez MD;  Location:  HEART CARDIAC CATH LAB     ORTHOPEDIC SURGERY         FAMILY HISTORY:  History reviewed. No pertinent family history.    SOCIAL HISTORY:  Social History     Socioeconomic History     Marital status:      Spouse name: None     Number of children: None     Years of education: None     Highest education level: None   Tobacco Use     Smoking status: Never   Substance and Sexual Activity     Alcohol use: Not Currently     Comment: 1 drink a month in the past     Drug use: No       Review of Systems:  Skin:  not assessed       Eyes:  not assessed      ENT:  not assessed      Respiratory:  Positive for sleep apnea;CPAP     Cardiovascular:  Negative      Gastroenterology: not assessed      Genitourinary:  not assessed      Musculoskeletal:  Positive for arthritis;joint pain;joint stiffness    Neurologic:  not assessed      Psychiatric:  not assessed      Heme/Lymph/Imm:  not assessed      Endocrine:  Negative        Physical Exam:  Vitals: /75   Pulse 64   Ht 1.854 m (6' 1\")   Wt 93.9 kg (207 lb)   SpO2 96%   BMI 27.31 kg/m    Eyes: No icterus.  Pulmonary: Chest symmetric, lungs clear bilaterally and no crackles, wheezes or rales.  Cardiovascular: RRR with normal S1 and S2, no murmur, JVP normal.  Musculoskeletal: Edema of the lower extremities: " None.  Neurologic: Oriented and appropriate without obvious focal deficits.   Psychiatric: Normal affect.     Recent Lab Results:  LIPID RESULTS:  Lab Results   Component Value Date    CHOL 132 10/03/2022    HDL 40 10/03/2022    LDL 66 10/03/2022    TRIG 131 10/03/2022       LIVER ENZYME RESULTS:  Lab Results   Component Value Date    AST 49 (H) 10/02/2022    ALT 66 10/02/2022       CBC RESULTS:  Lab Results   Component Value Date    WBC 5.1 10/03/2022    WBC 4.5 11/17/2014    RBC 5.14 10/03/2022    RBC 5.08 11/17/2014    HGB 15.9 10/03/2022    HGB 15.9 11/17/2014    HCT 48.1 10/03/2022    HCT 46.3 11/17/2014    MCV 94 10/03/2022    MCV 91 11/17/2014    MCH 30.9 10/03/2022    MCH 31.3 11/17/2014    MCHC 33.1 10/03/2022    MCHC 34.3 11/17/2014    RDW 13.2 10/03/2022    RDW 13.0 11/17/2014     10/03/2022     11/17/2014       BMP RESULTS:  Lab Results   Component Value Date     10/04/2022     11/17/2014    POTASSIUM 4.1 10/05/2022    POTASSIUM 4.2 11/17/2014    CHLORIDE 105 10/04/2022    CHLORIDE 109 11/17/2014    CO2 28 10/04/2022    CO2 27 11/17/2014    ANIONGAP 4 10/04/2022    ANIONGAP 6 11/17/2014    GLC 97 10/04/2022    GLC 97 11/17/2014    BUN 18 10/04/2022    BUN 11 11/17/2014    CR 0.93 10/04/2022    CR 1.05 11/17/2014    GFRESTIMATED 82 10/04/2022    GFRESTIMATED 69 11/17/2014    GFRESTBLACK 83 11/17/2014    BHARTI 8.7 10/04/2022    BHARTI 9.2 11/17/2014        A1C RESULTS:  No results found for: A1C    INR RESULTS:  Lab Results   Component Value Date    INR 1.01 09/30/2022    INR 0.94 11/19/2009       CC  Maryam Gipson PA-C  [id number]  [place]  [place] [id number]

## 2023-03-30 NOTE — LETTER
3/30/2023    Enrique Truong MD  [facility] [address]  [facility] [address]    RE: Candido Yana       Dear Colleague,     I had the pleasure of seeing Candido Holliday in the [facility].      Cardiology Clinic Progress Note      Today I had the pleasure of seeing Candido Holliday at [facility]. He is a pleasant 82 year old patient with a past medical history of coronary artery disease, hypertension and hyperlipidemia presents to the clinic for a follow-up visit.    I had initially seen him in 10/2022 during hospital admission for NSTEMI.  He underwent a coronary angiogram at that time which showed three-vessel coronary artery disease.  He refused bypass surgery and hence percutaneous revascularization was performed to his circumflex and ramus intermedius.  He also has a lesion in the LAD which was not significant by hemodynamic assessment.  He has been on dual antiplatelet therapy and other appropriate cardiac medications since.  He is tolerating these medications well although he reports getting bruise easily.  His LV systolic function is normal.  I interpreted his echocardiogram today.  I also interpreted his EKG which shows sinus rhythm with right bundle block and first-degree AV block.    Assessment and plan:    1.  Coronary disease s/p PCI of circumflex and ramus  2.  Residual disease in LAD which is negative hemodynamic assessment  3.  Essential hypertension  4.  Hyperlipidemia    It was a pleasure to meet Candido Holliday in clinic today.  I believe that he is doing well and has not had any complaints since undergoing coronary revascularization.  I have told him to continue to exercise on a regular basis and take all his medications as prescribed.  In 6 months time he will stop Brilinta and continue aspirin indefinitely.  All other medications will be continued indefinitely.  I will have him come back and see me in 1 year or sooner if needed.    All  medical records were reviewed in detail and discussed with the patient. Greater than 30 mins were spent with the patient, 50% of this time was spent on counseling and coordination of care.  After visit summary was printed and given to the patient.    Thank you for allowing me to participate in the care of Candido Holliday    This note was completed in part using Dragon voice recognition software. Although reviewed after completion, some word and grammatical errors may occur.    Ar Pinto MD  Cardiology    No orders of the defined types were placed in this encounter.      No orders of the defined types were placed in this encounter.      Medications Discontinued During This Encounter   Medication Reason     meloxicam (MOBIC) 15 MG tablet        Encounter Diagnoses   Name Primary?     NSTEMI (non-ST elevated myocardial infarction) (H)      Coronary artery disease involving native coronary artery of native heart with unstable angina pectoris (H)        CURRENT MEDICATIONS:  Current Outpatient Medications   Medication Sig Dispense Refill     Ascorbic Acid (VITAMIN C PO) Take 1 tablet by mouth daily       aspirin (ASA) 81 MG chewable tablet Take 1 tablet (81 mg) by mouth daily Starting tomorrow. 30 tablet 3     metoprolol tartrate (LOPRESSOR) 50 MG tablet Take 1 tablet (50 mg) by mouth 2 times daily 180 tablet 3     nitroGLYcerin (NITROSTAT) 0.4 MG sublingual tablet For chest pain place 1 tablet under the tongue every 5 minutes for 3 doses. If symptoms persist 5 minutes after 1st dose call 911. 30 tablet 1     omeprazole (PRILOSEC) 40 MG DR capsule Take 40 mg by mouth daily       rosuvastatin (CRESTOR) 40 MG tablet Take 1 tablet (40 mg) by mouth daily 90 tablet 3     tamsulosin (FLOMAX) 0.4 MG capsule Take 0.4 mg by mouth daily       ticagrelor (BRILINTA) 90 MG tablet Take 1 tablet (90 mg) by mouth every 12 hours 180 tablet 3     venlafaxine (EFFEXOR XR) 150 MG 24 hr capsule Take 150 mg by mouth daily       VITAMIN D  "PO Take 1 tablet by mouth daily         ALLERGIES   No Known Allergies    PAST MEDICAL HISTORY:  Past Medical History:   Diagnosis Date     Depressive disorder      Gastro-oesophageal reflux disease        PAST SURGICAL HISTORY:  Past Surgical History:   Procedure Laterality Date     CV CORONARY ANGIOGRAM N/A 10/3/2022    Procedure: Coronary Angiogram;  Surgeon: Jasmeet Lopez MD;  Location: [facility] CARDIAC CATH LAB     CV FRACTIONAL FLOW RATIO WIRE N/A 10/3/2022    Procedure: Fractional Flow Ratio Wire;  Surgeon: Jasmeet Lopez MD;  Location: [facility] CARDIAC CATH LAB     CV INTRAVASULAR ULTRASOUND N/A 10/3/2022    Procedure: Intravascular Ultrasound;  Surgeon: Jasmeet Lopez MD;  Location: [facility] CARDIAC CATH LAB     CV PCI N/A 10/3/2022    Procedure: Percutaneous Coronary Intervention;  Surgeon: Jasmeet Lopez MD;  Location: [facility] CARDIAC CATH LAB     ORTHOPEDIC SURGERY         FAMILY HISTORY:  History reviewed. No pertinent family history.    SOCIAL HISTORY:  Social History     Socioeconomic History     Marital status:      Spouse name: None     Number of children: None     Years of education: None     Highest education level: None   Tobacco Use     Smoking status: Never   Substance and Sexual Activity     Alcohol use: Not Currently     Comment: 1 drink a month in the past     Drug use: No       Review of Systems:  Skin:  not assessed       Eyes:  not assessed      ENT:  not assessed      Respiratory:  Positive for sleep apnea;CPAP     Cardiovascular:  Negative      Gastroenterology: not assessed      Genitourinary:  not assessed      Musculoskeletal:  Positive for arthritis;joint pain;joint stiffness    Neurologic:  not assessed      Psychiatric:  not assessed      Heme/Lymph/Imm:  not assessed      Endocrine:  Negative        Physical Exam:  Vitals: /75   Pulse 64   Ht 1.854 m (6' 1\")   Wt 93.9 kg (207 lb)   SpO2 96%   BMI 27.31 kg/m    Eyes: No " icterus.  Pulmonary: Chest symmetric, lungs clear bilaterally and no crackles, wheezes or rales.  Cardiovascular: RRR with normal S1 and S2, no murmur, JVP normal.  Musculoskeletal: Edema of the lower extremities: None.  Neurologic: Oriented and appropriate without obvious focal deficits.   Psychiatric: Normal affect.     Recent Lab Results:  LIPID RESULTS:  Lab Results   Component Value Date    CHOL 132 10/03/2022    HDL 40 10/03/2022    LDL 66 10/03/2022    TRIG 131 10/03/2022       LIVER ENZYME RESULTS:  Lab Results   Component Value Date    AST 49 (H) 10/02/2022    ALT 66 10/02/2022       CBC RESULTS:  Lab Results   Component Value Date    WBC 5.1 10/03/2022    WBC 4.5 11/17/2014    RBC 5.14 10/03/2022    RBC 5.08 11/17/2014    HGB 15.9 10/03/2022    HGB 15.9 11/17/2014    HCT 48.1 10/03/2022    HCT 46.3 11/17/2014    MCV 94 10/03/2022    MCV 91 11/17/2014    MCH 30.9 10/03/2022    MCH 31.3 11/17/2014    MCHC 33.1 10/03/2022    MCHC 34.3 11/17/2014    RDW 13.2 10/03/2022    RDW 13.0 11/17/2014     10/03/2022     11/17/2014       BMP RESULTS:  Lab Results   Component Value Date     10/04/2022     11/17/2014    POTASSIUM 4.1 10/05/2022    POTASSIUM 4.2 11/17/2014    CHLORIDE 105 10/04/2022    CHLORIDE 109 11/17/2014    CO2 28 10/04/2022    CO2 27 11/17/2014    ANIONGAP 4 10/04/2022    ANIONGAP 6 11/17/2014    GLC 97 10/04/2022    GLC 97 11/17/2014    BUN 18 10/04/2022    BUN 11 11/17/2014    CR 0.93 10/04/2022    CR 1.05 11/17/2014    GFRESTIMATED 82 10/04/2022    GFRESTIMATED 69 11/17/2014    GFRESTBLACK 83 11/17/2014    BHARTI 8.7 10/04/2022    BHARTI 9.2 11/17/2014        A1C RESULTS:  No results found for: A1C    INR RESULTS:  Lab Results   Component Value Date    INR 1.01 09/30/2022    INR 0.94 11/19/2009       WALE Gipson PA-C  [id number]  [place] [id number]      Thank you for allowing me to participate in the care of your patient.      Sincerely,     Ar  MD Blair     [facility]

## 2023-06-01 ENCOUNTER — HEALTH MAINTENANCE LETTER (OUTPATIENT)
Age: 83
End: 2023-06-01

## 2024-08-10 ENCOUNTER — HEALTH MAINTENANCE LETTER (OUTPATIENT)
Age: 84
End: 2024-08-10

## 2025-04-23 NOTE — Clinical Note
Stent deployed in the circumflex. Max pressure = 12 dannie. Total duration = 14 seconds.  Unable to assess

## 2025-08-16 ENCOUNTER — HEALTH MAINTENANCE LETTER (OUTPATIENT)
Age: 85
End: 2025-08-16

## (undated) DEVICE — KIT HAND CONTROL ANGIOTOUCH ACIST 65CM AT-P65

## (undated) DEVICE — DEFIB PRO-PADZ LVP LQD GEL ADULT 8900-2105-01

## (undated) DEVICE — MANIFOLD KIT ANGIO AUTOMATED 014613

## (undated) DEVICE — SLEEVE TR BAND RADIAL COMPRESSION DEVICE 24CM TRB24-REG

## (undated) DEVICE — GUIDEWIRE VASC 0.035INX150CM INQWIRE J TIP IQ35F150J3F/A

## (undated) DEVICE — WIRE GUIDE 0.035"X260CM SAFE-T-J EXCHANGE G00517

## (undated) DEVICE — CATH GUIDING BLUE YELLOW PTFE XB3.5 6FRX100CM 67005400

## (undated) DEVICE — CATH BALLOON NC EMERGE 3.50X12MM H7493926712350

## (undated) DEVICE — CATH JACKY 5FR 3.5 CURVE 40-5023

## (undated) DEVICE — INTRO GLIDESHEATH SLENDER 6FR 10X45CM 60-1060

## (undated) DEVICE — GUIDEWIRE VASC 0.014INX180CM RUNTHROUGH 25-1011

## (undated) DEVICE — INFL DVC BASIXCOMPAK PLYCRB 30 ATM 13IN 20ML IN4530

## (undated) DEVICE — CATH IVUS OPTICROSS HD 6 3.6FR 1.18MM DIA 135CML H7493935408

## (undated) DEVICE — CATH BALLOON EMERGE 2.0X12MM H7493918912200

## (undated) DEVICE — CATH BALLOON NC EMERGE 2.50X15MM H7493926715250

## (undated) DEVICE — CATH BALLOON NC EMERGE 3.00X8MM H7493926708300

## (undated) DEVICE — CATH BALLOON NC EMERGE 3.50X8MM H7493926708350

## (undated) DEVICE — CATH GUIDING BLUE YELLOW PTFE XB3 6FRX100CM 67005200

## (undated) DEVICE — TOTE ANGIO CORP PC15AT SAN32CC83O

## (undated) DEVICE — CATH DIAGNOSTIC RADIAL 5FR TIG 4.0

## (undated) DEVICE — GW VASC OMNIWIRE J L185CM PRESSURE 89185J

## (undated) RX ORDER — HEPARIN SODIUM 1000 [USP'U]/ML
INJECTION, SOLUTION INTRAVENOUS; SUBCUTANEOUS
Status: DISPENSED
Start: 2022-10-03

## (undated) RX ORDER — NITROGLYCERIN 5 MG/ML
VIAL (ML) INTRAVENOUS
Status: DISPENSED
Start: 2022-10-03

## (undated) RX ORDER — LIDOCAINE HYDROCHLORIDE 10 MG/ML
INJECTION, SOLUTION EPIDURAL; INFILTRATION; INTRACAUDAL; PERINEURAL
Status: DISPENSED
Start: 2022-10-03

## (undated) RX ORDER — HEPARIN SODIUM 200 [USP'U]/100ML
INJECTION, SOLUTION INTRAVENOUS
Status: DISPENSED
Start: 2022-10-03

## (undated) RX ORDER — DOBUTAMINE HYDROCHLORIDE 200 MG/100ML
INJECTION INTRAVENOUS
Status: DISPENSED
Start: 2022-10-03

## (undated) RX ORDER — ADENOSINE 3 MG/ML
INJECTION, SOLUTION INTRAVENOUS
Status: DISPENSED
Start: 2022-10-03

## (undated) RX ORDER — FENTANYL CITRATE 50 UG/ML
INJECTION, SOLUTION INTRAMUSCULAR; INTRAVENOUS
Status: DISPENSED
Start: 2022-10-03